# Patient Record
Sex: MALE | Race: WHITE | Employment: FULL TIME | URBAN - METROPOLITAN AREA
[De-identification: names, ages, dates, MRNs, and addresses within clinical notes are randomized per-mention and may not be internally consistent; named-entity substitution may affect disease eponyms.]

---

## 2017-02-21 ENCOUNTER — OFFICE VISIT (OUTPATIENT)
Dept: FAMILY MEDICINE CLINIC | Facility: CLINIC | Age: 42
End: 2017-02-21

## 2017-02-21 VITALS
SYSTOLIC BLOOD PRESSURE: 110 MMHG | WEIGHT: 199.63 LBS | DIASTOLIC BLOOD PRESSURE: 72 MMHG | OXYGEN SATURATION: 98 % | TEMPERATURE: 99 F | HEIGHT: 71.75 IN | BODY MASS INDEX: 27.34 KG/M2 | HEART RATE: 69 BPM | RESPIRATION RATE: 16 BRPM

## 2017-02-21 DIAGNOSIS — G89.29 CHRONIC RIGHT-SIDED THORACIC BACK PAIN: ICD-10-CM

## 2017-02-21 DIAGNOSIS — J01.10 ACUTE NON-RECURRENT FRONTAL SINUSITIS: Primary | ICD-10-CM

## 2017-02-21 DIAGNOSIS — M54.6 CHRONIC RIGHT-SIDED THORACIC BACK PAIN: ICD-10-CM

## 2017-02-21 PROCEDURE — 99213 OFFICE O/P EST LOW 20 MIN: CPT | Performed by: FAMILY MEDICINE

## 2017-02-21 RX ORDER — AMOXICILLIN AND CLAVULANATE POTASSIUM 875; 125 MG/1; MG/1
1 TABLET, FILM COATED ORAL 2 TIMES DAILY
Qty: 20 TABLET | Refills: 0 | Status: SHIPPED | OUTPATIENT
Start: 2017-02-21 | End: 2017-03-02 | Stop reason: ALTCHOICE

## 2017-02-21 NOTE — PROGRESS NOTES
Chief Complaint:  Patient presents with:  Sore Throat: Sore throat, headaches, body aches, post nasal drainage, ears are popping. Referral: Referral to chiropractor. Orders Call: Discuss order for heart scan.     HPI:  This is a 46year old male patient p External Solution Apply topically nightly. Disp:  Rfl:    Amoxicillin-Pot Clavulanate 875-125 MG Oral Tab Take 1 tablet by mouth 2 (two) times daily.  Disp: 20 tablet Rfl: 0   alprazolam (XANAX) 0.5 MG Oral Tab Take 1 tablet (0.5 mg total) by mouth 2 (two) below.  -     Amoxicillin-Pot Clavulanate 875-125 MG Oral Tab; Take 1 tablet by mouth 2 (two) times daily. Chronic right-sided thoracic back pain  Given success with chiropractic care in the past, will refer back to chiropractic care for further care.

## 2017-03-01 ENCOUNTER — TELEPHONE (OUTPATIENT)
Dept: FAMILY MEDICINE CLINIC | Facility: CLINIC | Age: 42
End: 2017-03-01

## 2017-03-01 DIAGNOSIS — J01.41 ACUTE RECURRENT PANSINUSITIS: Primary | ICD-10-CM

## 2017-03-01 NOTE — TELEPHONE ENCOUNTER
In order to write a referral and include clinical information, the patient must see a provider to address his shoulder issues   Please call the patient and assist him with scheduling and appt with Dr Pepper Thomas or any of the mid levels

## 2017-03-01 NOTE — TELEPHONE ENCOUNTER
REFERRAL  Received:  Today       Vanessa Dangelo Emg [address] Clinical Staff Cc: [facility]       Phone Number: [phone]                     Patient Name: JAY JAY TIRADO   : 10/24/1975   Reason for the order/referral:   PCP: Marina Garcia

## 2017-03-01 NOTE — TELEPHONE ENCOUNTER
Pt was seen last Tuesday and given a antibiotic and is not feeling any better he is still on the antibiotic but is concerned because he is not getting better.

## 2017-03-02 ENCOUNTER — OFFICE VISIT (OUTPATIENT)
Dept: FAMILY MEDICINE CLINIC | Facility: CLINIC | Age: 42
End: 2017-03-02

## 2017-03-02 VITALS
SYSTOLIC BLOOD PRESSURE: 110 MMHG | HEART RATE: 72 BPM | WEIGHT: 196.19 LBS | BODY MASS INDEX: 26.87 KG/M2 | DIASTOLIC BLOOD PRESSURE: 70 MMHG | HEIGHT: 71.75 IN | RESPIRATION RATE: 12 BRPM

## 2017-03-02 DIAGNOSIS — S43.431A SUPERIOR GLENOID LABRUM LESION OF RIGHT SHOULDER, INITIAL ENCOUNTER: Primary | ICD-10-CM

## 2017-03-02 PROCEDURE — 99213 OFFICE O/P EST LOW 20 MIN: CPT | Performed by: FAMILY MEDICINE

## 2017-03-02 RX ORDER — PREDNISONE 20 MG/1
40 TABLET ORAL DAILY
Qty: 10 TABLET | Refills: 0 | Status: SHIPPED | OUTPATIENT
Start: 2017-03-02 | End: 2017-03-02 | Stop reason: ALTCHOICE

## 2017-03-02 RX ORDER — LEVOFLOXACIN 500 MG/1
500 TABLET, FILM COATED ORAL DAILY
Qty: 10 TABLET | Refills: 0 | Status: SHIPPED | OUTPATIENT
Start: 2017-03-02 | End: 2017-03-12

## 2017-03-02 NOTE — TELEPHONE ENCOUNTER
Called patient on cell number, informed him of 2 new Rx's, pt requested scripts to [facility] on [address], Dr Mane Alvarez already send these in, called [facility] on [address], Pharmacy closed, but left detailed message on pharmacy phone to transfer both [phone]

## 2017-03-02 NOTE — TELEPHONE ENCOUNTER
Let's change to Levaquin and add prednisone. If this does not resolve, needs to be seen. Should be using flonase as well.   Adrien Cueva, DO

## 2017-03-02 NOTE — PROGRESS NOTES
Patient presents with:  Referral: shoulder pain      HPI:   Christiana Finch is a 46year old male who presents to the office for R shoulder issues. R shoulder - chronic issues. Years ago got referral to see Vidya amaya. This did help.   Completed P

## 2017-03-06 PROBLEM — M50.30 DEGENERATION OF CERVICAL INTERVERTEBRAL DISC: Status: ACTIVE | Noted: 2017-03-06

## 2017-03-06 PROBLEM — M54.12 RIGHT CERVICAL RADICULOPATHY: Status: ACTIVE | Noted: 2017-03-06

## 2017-03-06 PROBLEM — G54.0 THORACIC OUTLET SYNDROME: Status: ACTIVE | Noted: 2017-03-06

## 2017-03-06 PROBLEM — S43.431D LABRAL TEAR OF SHOULDER, RIGHT, SUBSEQUENT ENCOUNTER: Status: ACTIVE | Noted: 2017-03-06

## 2017-03-09 ENCOUNTER — HOSPITAL ENCOUNTER (OUTPATIENT)
Dept: PHYSICAL THERAPY | Facility: HOSPITAL | Age: 42
Setting detail: THERAPIES SERIES
Discharge: HOME | End: 2017-03-09
Attending: ORTHOPAEDIC SURGERY
Payer: COMMERCIAL

## 2017-03-09 DIAGNOSIS — M25.511 RIGHT SHOULDER PAIN, UNSPECIFIED CHRONICITY: Primary | ICD-10-CM

## 2017-03-09 DIAGNOSIS — M54.12 RIGHT CERVICAL RADICULOPATHY: ICD-10-CM

## 2017-03-09 DIAGNOSIS — M50.30 DEGENERATION OF CERVICAL INTERVERTEBRAL DISC: ICD-10-CM

## 2017-03-09 DIAGNOSIS — G54.0 THORACIC OUTLET SYNDROME: ICD-10-CM

## 2017-03-09 DIAGNOSIS — M54.10 RADICULOPATHY OF ARM: ICD-10-CM

## 2017-03-09 PROCEDURE — 97162 PT EVAL MOD COMPLEX 30 MIN: CPT

## 2017-03-09 PROCEDURE — 97140 MANUAL THERAPY 1/> REGIONS: CPT

## 2017-03-09 PROCEDURE — 97110 THERAPEUTIC EXERCISES: CPT

## 2017-03-09 NOTE — PROGRESS NOTES
SPINE EVALUATION:   Referring Physician: Dr. Nenita Hugo  Diagnosis: Right shoulder pain, unspecified chronicity (M25.511)  Radiculopathy of arm (M54.10)  Degeneration of cervical intervertebral disc (M50.30)  Right cervical radiculopathy (M54.12)  Thoracic ADL's, disturbed sleep at night, lifting, carrying. Keagan describes prior level of function very active, has young children. Pt goals include no more nerve pain; ability to use shoulder. Past medical history was reviewed with Keagan.  Significant findings region.      Strength:    strength 50/58/58 kg; 48/48/48 kg    Flexibility:   UE/Scapular   Upper Trap: R moderate; L moderate  Levator Scap: R moderate; L moderate  Pec Major: R moderate; L moderate     Special tests:   ULTT1: L: -; R: + with increased instruction    Education or treatment limitation: high symptoms irritability, distal symptoms  Rehab Potential:good    FOTO: 41/100    Patient/Family/Caregiver was advised of these findings, precautions, and treatment options and has agreed to actively par

## 2017-03-14 ENCOUNTER — HOSPITAL ENCOUNTER (OUTPATIENT)
Dept: MRI IMAGING | Facility: HOSPITAL | Age: 42
Discharge: HOME | End: 2017-03-14
Attending: ORTHOPAEDIC SURGERY
Payer: COMMERCIAL

## 2017-03-14 ENCOUNTER — APPOINTMENT (OUTPATIENT)
Dept: PHYSICAL THERAPY | Facility: HOSPITAL | Age: 42
End: 2017-03-14
Attending: ORTHOPAEDIC SURGERY
Payer: COMMERCIAL

## 2017-03-14 DIAGNOSIS — M50.30 DEGENERATION OF CERVICAL INTERVERTEBRAL DISC: ICD-10-CM

## 2017-03-14 DIAGNOSIS — M54.12 RIGHT CERVICAL RADICULOPATHY: ICD-10-CM

## 2017-03-16 ENCOUNTER — APPOINTMENT (OUTPATIENT)
Dept: PHYSICAL THERAPY | Facility: HOSPITAL | Age: 42
End: 2017-03-16
Attending: ORTHOPAEDIC SURGERY
Payer: COMMERCIAL

## 2017-03-20 ENCOUNTER — HOSPITAL ENCOUNTER (OUTPATIENT)
Dept: MRI IMAGING | Age: 42
Discharge: HOME | End: 2017-03-20
Attending: ORTHOPAEDIC SURGERY
Payer: COMMERCIAL

## 2017-03-20 PROCEDURE — 72141 MRI NECK SPINE W/O DYE: CPT

## 2017-03-20 NOTE — PROGRESS NOTES
Quick Note:    CONCLUSION: Moderate degenerative changes in the cervical spine most notable at C6-C7 where there is a mild to moderate posterior disc osteophyte complex which extends into the right neural foramen resulting in moderate to severe foraminal n

## 2017-03-21 ENCOUNTER — APPOINTMENT (OUTPATIENT)
Dept: PHYSICAL THERAPY | Facility: HOSPITAL | Age: 42
End: 2017-03-21
Attending: ORTHOPAEDIC SURGERY
Payer: COMMERCIAL

## 2017-03-22 ENCOUNTER — TELEPHONE (OUTPATIENT)
Dept: FAMILY MEDICINE CLINIC | Facility: CLINIC | Age: 42
End: 2017-03-22

## 2017-03-22 DIAGNOSIS — M54.12 RIGHT CERVICAL RADICULOPATHY: ICD-10-CM

## 2017-03-22 DIAGNOSIS — M50.30 DDD (DEGENERATIVE DISC DISEASE), CERVICAL: Primary | ICD-10-CM

## 2017-03-22 DIAGNOSIS — M54.12 CERVICAL RADICULOPATHY AT C7: Primary | ICD-10-CM

## 2017-03-22 NOTE — TELEPHONE ENCOUNTER
Please see the Referral Encounter from today, Dr Jonelle Vela has suggested patient see pain management

## 2017-03-22 NOTE — TELEPHONE ENCOUNTER
REFERRAL  Received: Yesterday       Baron Fong Em[address] Clinical Staff Cc: [facility]       Phone Number: [phone]                     Patient Name: Izabella Barney   : 10/24/1975   Reason for the order/referral:   PCP:  SHARMAINE

## 2017-03-22 NOTE — TELEPHONE ENCOUNTER
See separate documentation for DNI referral for neurosurgery who can either arrange for Dr. Eduardo Donovan or can consider other options

## 2017-03-22 NOTE — TELEPHONE ENCOUNTER
Pt called from work in excrutiating pain. Pt has shoulder pain and neck pain already documented. Pt states pain is so bad he has headache and his mouth/jaw hurts.   Pt saw Dr Aayush Ng as instructed, and Dr Aayush Ng wants Pt to see a pain specialist for possib

## 2017-03-22 NOTE — TELEPHONE ENCOUNTER
Patient seen Dr Juan Ayala, completed MRI cervical; see below  Referral to Dr Alan Tobin PENDING above    Notes Recorded by Delta Chau MD on 3/20/2017 at 4:38 PM  CONCLUSION: Moderate degenerative changes in the cervical spine most notable at C6-C7 where ther

## 2017-03-22 NOTE — TELEPHONE ENCOUNTER
Actually shoulder specialist already saw him (Dr. Aureliano Lane), ordered MRI and it looks like he may have a C7 radiculopathy.   Please get him in with the [facility] neurovascular Early neurosurgeon such as Dr. Joan Levy    Diagnoses and all orders for this visit:

## 2017-03-23 ENCOUNTER — APPOINTMENT (OUTPATIENT)
Dept: PHYSICAL THERAPY | Facility: HOSPITAL | Age: 42
End: 2017-03-23
Attending: ORTHOPAEDIC SURGERY
Payer: COMMERCIAL

## 2017-03-23 ENCOUNTER — TELEPHONE (OUTPATIENT)
Dept: FAMILY MEDICINE CLINIC | Facility: CLINIC | Age: 42
End: 2017-03-23

## 2017-03-23 DIAGNOSIS — M54.12 RIGHT CERVICAL RADICULOPATHY: ICD-10-CM

## 2017-03-23 DIAGNOSIS — M50.30 DEGENERATION OF CERVICAL INTERVERTEBRAL DISC: Primary | ICD-10-CM

## 2017-03-23 NOTE — TELEPHONE ENCOUNTER
Patient is calling in for a referral for pain clinic, for an injection. Please speak with him he states he has been waiting for our call since Monday.

## 2017-03-23 NOTE — TELEPHONE ENCOUNTER
Patient has called Dr Cayla Cardoso office and wasn't able to get in until 4/6/17 with PA, Dr Heidy Gamez unavailable, Pt is requesting we call to see if he can be seen sooner  Called and spoke to [facility], she is aware of the patient's need to be seen sooner and there is

## 2017-03-23 NOTE — TELEPHONE ENCOUNTER
Patient called office, informed that Dr Morelia Vu preferred he see neurosurgeon first and then surgeon can decide if moving forward with Dr Benjamin Ji the pain specialist is appropriate.   Patient did not agree with this course and is refusing to see the neurosurgeon,

## 2017-03-23 NOTE — TELEPHONE ENCOUNTER
Pt is requesting  RX to help manage the pain until his appointment at [facility] on 4/6/17, LOV with Dr Sacha Vera 03/02/17, Pt aware Dr Sacha Vera is out of the office today.   in the past he has received   Prednisone 20 mgs, 2 tabs daily from Dr Vadim Mora; did help

## 2017-03-24 RX ORDER — CYCLOBENZAPRINE HCL 10 MG
10 TABLET ORAL 3 TIMES DAILY
Qty: 60 TABLET | Refills: 1 | Status: SHIPPED | OUTPATIENT
Start: 2017-03-24 | End: 2017-04-13

## 2017-03-24 NOTE — TELEPHONE ENCOUNTER
Steroids without an end point not a great idea for this  happyt to Rx the flexeril. Use TID PRN, but careful as SE is sedation. Do not take and drive. For the stronger meds, would have to contact víctor.

## 2017-03-24 NOTE — TELEPHONE ENCOUNTER
Called Pt and left message on mobile phone with response from Dr Justina Keys. Advised in message that Pt can call office if she has questions.

## 2017-03-27 ENCOUNTER — APPOINTMENT (OUTPATIENT)
Dept: PHYSICAL THERAPY | Facility: HOSPITAL | Age: 42
End: 2017-03-27
Attending: ORTHOPAEDIC SURGERY
Payer: COMMERCIAL

## 2017-03-30 ENCOUNTER — APPOINTMENT (OUTPATIENT)
Dept: PHYSICAL THERAPY | Facility: HOSPITAL | Age: 42
End: 2017-03-30
Attending: ORTHOPAEDIC SURGERY
Payer: COMMERCIAL

## 2017-04-03 ENCOUNTER — APPOINTMENT (OUTPATIENT)
Dept: PHYSICAL THERAPY | Facility: HOSPITAL | Age: 42
End: 2017-04-03
Attending: ORTHOPAEDIC SURGERY
Payer: COMMERCIAL

## 2017-04-06 ENCOUNTER — OFFICE VISIT (OUTPATIENT)
Dept: SURGERY | Facility: CLINIC | Age: 42
End: 2017-04-06

## 2017-04-06 ENCOUNTER — TELEPHONE (OUTPATIENT)
Dept: SURGERY | Facility: CLINIC | Age: 42
End: 2017-04-06

## 2017-04-06 VITALS — DIASTOLIC BLOOD PRESSURE: 80 MMHG | RESPIRATION RATE: 14 BRPM | HEART RATE: 100 BPM | SYSTOLIC BLOOD PRESSURE: 118 MMHG

## 2017-04-06 DIAGNOSIS — M47.812 CERVICAL SPONDYLOSIS WITHOUT MYELOPATHY: ICD-10-CM

## 2017-04-06 DIAGNOSIS — M54.12 CERVICAL RADICULOPATHY AT C7: Primary | ICD-10-CM

## 2017-04-06 DIAGNOSIS — M50.30 DDD (DEGENERATIVE DISC DISEASE), CERVICAL: ICD-10-CM

## 2017-04-06 DIAGNOSIS — M54.12 CERVICAL RADICULITIS: Primary | ICD-10-CM

## 2017-04-06 PROCEDURE — 99205 OFFICE O/P NEW HI 60 MIN: CPT | Performed by: NURSE PRACTITIONER

## 2017-04-06 RX ORDER — GABAPENTIN 100 MG/1
300 CAPSULE ORAL NIGHTLY
Qty: 90 CAPSULE | Refills: 0 | Status: SHIPPED | OUTPATIENT
Start: 2017-04-06 | End: 2017-11-27 | Stop reason: ALTCHOICE

## 2017-04-06 NOTE — H&P
Name: Christiana Finch   : 10/24/1975   DOS: 2017     Patient presents with:  Neck Pain: NP referred by Dr. Nehal Davidson          History of Present Illness:     Christiana Finch is a 46year old male referred by PCP.  Complaining of right cervical pain which Acute tonsillitis    • Concussion    • Dizziness    • Foot pain      soft tissue   • Lower back pain    • Otitis media of left ear    • Skin rash    • Sore throat    • URI (upper respiratory infection)    • Viral labyrinthitis    • Lipid screening 5/12/201 concentration & attention span intact. Inspection:  Ambulates with well-coordinated, fluid, non-antalgic gait. Heel and toe walking intact. Romberg negative. Tandem walk intact  HEENT: No gross lesion noted. PEERL. No icterus, ptosis, or nystagmus.   Car radiculopathy      Assessment and Plan     Acute pain that has failed conservative treatment consisting of medications, activity modification, PT, chiropractor. Review of radiology reports and films. MRI shows C7 radiculopathy.   He has weakness to the ri

## 2017-04-06 NOTE — PROGRESS NOTES
HPI:    Patient ID: Prieto Gross is a 46year old male. HPI    Review of Systems         Current Outpatient Prescriptions:  Cyclobenzaprine HCl 10 MG Oral Tab Take 1 tablet (10 mg total) by mouth 3 (three) times daily.  Disp: 60 tablet Rfl: 1   Acetam Past Treatments for Current Pain Condition:   Physical Therapy and Other accupunctur, chiropractor,dry needle,     Prior diagnostic testing for your pain:  Mri, xray

## 2017-04-06 NOTE — PATIENT INSTRUCTIONS
Refill policies:    • Allow 2 business days for refills; controlled substances may take longer.   • Contact your pharmacy at least 5 days prior to running out of medication and have them send an electronic request or submit request through the “request re insurance carrier to obtain pre-certification or prior authorization. Unfortunately, [employer] has seen an increase in denial of payment even though the procedure/test has been pre-certified.   You are strongly encouraged to contact your insurance carrier to v please contact the office 48 hours prior to your procedure.       Medication:   Number of days you need to be off for the following medications:  • Aggrenox 10 days   • Agrylin (Anagrelide) 10 days   • Enbrel (Etanercept) 24 hours   • Fragmin (Dalteparin) 2 temporary increase in your blood sugar. Contact your primary care physician if your blood sugar rises as you may require some medication adjustment.   It is normal to have increased pain at injection site for up to 3-5 days after procedure, you can use hea a local anesthetic. Some patients choose to receive intravenous sedation that can make the procedure easier to tolerate. This type of sedation may cause amnesia and patients may not remember parts or all of the actual procedure.    How is the epidural injec more. If three injections have not helped you very much, you may be a candidate for a different procedure. It is important to keep follow up appointment to clearly communicate with your provider how you are feeling. Will the epidural injection help me?   I

## 2017-04-10 ENCOUNTER — APPOINTMENT (OUTPATIENT)
Dept: PHYSICAL THERAPY | Facility: HOSPITAL | Age: 42
End: 2017-04-10
Attending: ORTHOPAEDIC SURGERY
Payer: COMMERCIAL

## 2017-04-13 ENCOUNTER — TELEPHONE (OUTPATIENT)
Dept: SURGERY | Facility: CLINIC | Age: 42
End: 2017-04-13

## 2017-04-13 ENCOUNTER — APPOINTMENT (OUTPATIENT)
Dept: PHYSICAL THERAPY | Facility: HOSPITAL | Age: 42
End: 2017-04-13
Attending: ORTHOPAEDIC SURGERY
Payer: COMMERCIAL

## 2017-04-13 NOTE — TELEPHONE ENCOUNTER
P referral entered for Cervical Epidural Steroid Injections  Requesting 3 visits, coverage of [id number]  [facility] referral #[id number]  Referral authorized.

## 2017-05-22 ENCOUNTER — TELEPHONE (OUTPATIENT)
Dept: SURGERY | Facility: CLINIC | Age: 42
End: 2017-05-22

## 2017-05-22 ENCOUNTER — OFFICE VISIT (OUTPATIENT)
Dept: FAMILY MEDICINE CLINIC | Facility: CLINIC | Age: 42
End: 2017-05-22

## 2017-05-22 VITALS
SYSTOLIC BLOOD PRESSURE: 108 MMHG | HEIGHT: 71 IN | WEIGHT: 198.63 LBS | RESPIRATION RATE: 16 BRPM | HEART RATE: 88 BPM | DIASTOLIC BLOOD PRESSURE: 64 MMHG | BODY MASS INDEX: 27.81 KG/M2 | TEMPERATURE: 99 F

## 2017-05-22 DIAGNOSIS — J30.1 SEASONAL ALLERGIC RHINITIS DUE TO POLLEN: Primary | ICD-10-CM

## 2017-05-22 PROCEDURE — 99213 OFFICE O/P EST LOW 20 MIN: CPT | Performed by: PHYSICIAN ASSISTANT

## 2017-05-22 NOTE — PROGRESS NOTES
CC:  Patient presents with: Allergies: Moved to a new house 8/2016. Beginning 6 days ago has been feverish,ears are popping, throat is sore (less sore today),PND,cough,chilling at times. Taking claritin with some relief.  Is expecorating yellow mucus and y Take  by mouth. Disp:  Rfl:      No current facility-administered medications on file prior to visit.     Review of Systems:     Constitutional: No fatigue; normal energy; no weight changes; no fever   HENT: See HPI  Eyes: Normal vision; no eye pain or FBs Active Problem List:     Acute reaction to stress     Contact dermatitis and other eczema, due to unspecified cause     Closed head injury with concussion     Tenosynovitis of foot and ankle     Sprain and strain of unspecified site of shoulder and upper a

## 2017-06-01 ENCOUNTER — TELEPHONE (OUTPATIENT)
Dept: SURGERY | Facility: CLINIC | Age: 42
End: 2017-06-01

## 2017-06-01 ENCOUNTER — HOSPITAL ENCOUNTER (OUTPATIENT)
Dept: GENERAL RADIOLOGY | Facility: HOSPITAL | Age: 42
Setting detail: HOSPITAL OUTPATIENT SURGERY
Discharge: HOME | End: 2017-06-01
Attending: ANESTHESIOLOGY
Payer: COMMERCIAL

## 2017-06-01 ENCOUNTER — SURGERY (OUTPATIENT)
Age: 42
End: 2017-06-01

## 2017-06-01 ENCOUNTER — HOSPITAL ENCOUNTER (OUTPATIENT)
Facility: HOSPITAL | Age: 42
Setting detail: HOSPITAL OUTPATIENT SURGERY
Discharge: HOME | End: 2017-06-01
Attending: ANESTHESIOLOGY | Admitting: ANESTHESIOLOGY
Payer: COMMERCIAL

## 2017-06-01 VITALS
DIASTOLIC BLOOD PRESSURE: 87 MMHG | TEMPERATURE: 99 F | HEART RATE: 69 BPM | RESPIRATION RATE: 16 BRPM | OXYGEN SATURATION: 97 % | SYSTOLIC BLOOD PRESSURE: 134 MMHG

## 2017-06-01 DIAGNOSIS — M54.12 CERVICAL RADICULITIS: ICD-10-CM

## 2017-06-01 DIAGNOSIS — G89.29 CHRONIC NECK PAIN: ICD-10-CM

## 2017-06-01 DIAGNOSIS — M54.2 CHRONIC NECK PAIN: ICD-10-CM

## 2017-06-01 RX ORDER — SODIUM CHLORIDE, SODIUM LACTATE, POTASSIUM CHLORIDE, CALCIUM CHLORIDE 600; 310; 30; 20 MG/100ML; MG/100ML; MG/100ML; MG/100ML
100 INJECTION, SOLUTION INTRAVENOUS CONTINUOUS
Status: DISCONTINUED | OUTPATIENT
Start: 2017-06-01 | End: 2017-06-01

## 2017-06-01 NOTE — H&P
History & Physical Examination    Patient Name: Reilly Burns  MRN: [id number]  CSN: [id number]  YOB: 1975    Pre-Operative Diagnosis:  Cervical radiculitis [M54.12]    Present Illness: A 46year old male with neck pain is here for cervical History    OTHER SURGICAL HISTORY  1996    Comment urethra surgery, removal of cyst    VASECTOMY Bilateral 10/15/2015    Comment Dr. Vane Vasques     Family History   Problem Relation Age of Onset   • CHF[other] [OTHER] Mother    • Heart Disease Father    • Other proceed with plan of care. [ x ] I have reviewed the History and Physical done within the last 30 days. Any changes noted above.     ANMOL [place]

## 2017-06-01 NOTE — PROGRESS NOTES
Dr. Florencia Tay with pt in preop, states pt is pain free now. Case cancelled. Pt sent home in good condition.

## 2017-06-01 NOTE — TELEPHONE ENCOUNTER
Patient's procedure was cancelled by Dr. Donya Valverde today because patient was not having pain at this time.  He has asked patient to call our office back if the pain should flare again so that we can quickly get him scheduled for an injection procedure at that ti

## 2017-06-07 ENCOUNTER — TELEPHONE (OUTPATIENT)
Dept: SURGERY | Facility: CLINIC | Age: 42
End: 2017-06-07

## 2017-06-07 RX ORDER — METHOCARBAMOL 500 MG/1
500 TABLET, FILM COATED ORAL EVERY 8 HOURS PRN
Qty: 90 TABLET | Refills: 0 | Status: SHIPPED | OUTPATIENT
Start: 2017-06-07 | End: 2017-11-27 | Stop reason: ALTCHOICE

## 2017-06-07 NOTE — TELEPHONE ENCOUNTER
Case removed from OR schedule. Confirmed cancellation with pt. Informed pt medication would need to be ordered thru Neurosurgery office. Pt verbalized understanding. New TE for med request routed to neurosurgery.

## 2017-06-07 NOTE — TELEPHONE ENCOUNTER
Pt called office to cancel procedures and requesting muscle relaxant. Pt was NP seen by ANMOL Ortiz on 4/6/17.       Per OV notes:    Assessment and Plan      Acute pain that has failed conservative treatment consisting of medications, activity modification,

## 2017-06-07 NOTE — TELEPHONE ENCOUNTER
Methocarbamol 500mg q8h PRN prescribed. Please advise patient to take 1st dose at night to see if there are any sedating effects.

## 2017-09-19 ENCOUNTER — TELEPHONE (OUTPATIENT)
Dept: FAMILY MEDICINE CLINIC | Facility: CLINIC | Age: 42
End: 2017-09-19

## 2017-09-19 DIAGNOSIS — G54.0 THORACIC OUTLET SYNDROME: ICD-10-CM

## 2017-09-19 DIAGNOSIS — S43.431D LABRAL TEAR OF SHOULDER, RIGHT, SUBSEQUENT ENCOUNTER: ICD-10-CM

## 2017-09-19 DIAGNOSIS — M54.12 RIGHT CERVICAL RADICULOPATHY: ICD-10-CM

## 2017-09-19 DIAGNOSIS — M50.30 DEGENERATION OF CERVICAL INTERVERTEBRAL DISC: ICD-10-CM

## 2017-09-19 DIAGNOSIS — M25.511 PAIN IN JOINT OF RIGHT SHOULDER: Primary | ICD-10-CM

## 2017-09-19 NOTE — TELEPHONE ENCOUNTER
Reviewed MEDIA, Patient has been seen by [facility] Dr Jean Paul Mays for years for LBP, will request LOV note to get most update information before completing referral request

## 2017-09-19 NOTE — TELEPHONE ENCOUNTER
Lincoln Fairbanks CNA  P Em[address] Clinical Staff Cc: [facility]   Phone Number: [phone]             .Reason for the order/referral:Referral   PCP: Dr. Juan Stacy   Refer to Provider: Dr. Geovanna Mendez   Specialty:Chiropractor   Patient Ins

## 2017-09-19 NOTE — TELEPHONE ENCOUNTER
Front Staff  Please call Dr Virgil Blunt and request the LOV with this patient, I am unsure of when this was, please fax is to our office  Thank you.     Keith Niño DC  Chiropractor  [address] [phone]    *Send this TE back to the [facility]

## 2017-09-21 NOTE — TELEPHONE ENCOUNTER
Called Dr. Paolo Owens' office and left message asking that they contact our office, let them know we were looking for last office visit for patient

## 2017-09-25 NOTE — TELEPHONE ENCOUNTER
Called Dr. Shayna Saldivar office, spoke to , she said he is currently out of town until tomorrow. However she did send him a message in regards to our office requesting last office visit.  Gave her our fax and telephone number, she will fax over inf

## 2017-09-28 NOTE — TELEPHONE ENCOUNTER
Patient contacted office wanting to know why we have yet to process his referral. I explained to patient that we are waiting on Dr. Orion Knott office to fax over [address] summary since our clinical supervisor is requesting notes.  Patient will contact Dr. Selvin Erickson

## 2017-10-02 NOTE — TELEPHONE ENCOUNTER
LM for Dr. Anh Teresa to fax over last office visit notes from the last visit prior to referral being entered.

## 2017-10-10 NOTE — TELEPHONE ENCOUNTER
LOV relating to right shoulder and cervical region issued with Dr Cecile Rosales on 03/02/2017, Referral request forward to Dr Cecile Rosales

## 2017-10-10 NOTE — TELEPHONE ENCOUNTER
Unclear if patient is to continue chiropractic therapy of see Neurosurgery regarding right shoulder pain, Cervical pain, and Lumbar pain, Patient CANCELED procedure with Dr Jose Raul Fox and CANCELED Physical Therapy.  LOV with Dr Jono Nguyen March 2017, LOV at [facility] 3 with

## 2017-10-10 NOTE — TELEPHONE ENCOUNTER
Complaining of right cervical pain which radiates in to shoulder girdle scapula and ulnar forearm into the #3, 4 and 5 fingers. At times radiate around from scapula into pectoral muscle. Denies pain to the left. subjective weakness. He is right handed.  [employer]

## 2017-10-30 NOTE — PROGRESS NOTES
Chief Complaint:  Patient presents with: Anxiety: Pt feels that he has stress and anxiety. Pt is saying things that he shouldn't. Feels that he is irritable for past several  months.     HPI:  This is a 38year old male patient presenting for Anxiety (Pt f URI (upper respiratory infection)    • Viral labyrinthitis      Past Surgical History:   Procedure Laterality Date   • Other surgical history  1996    urethra surgery, removal of cyst   • Vasectomy Bilateral 10/15/2015    Dr. Neymar Tran Disp:  Rfl:      Allergies:    Bees                        EXAM:   10/30/17  1406   BP: 120/66   Pulse: 72   Resp: 16   Weight: 206 lb 3.2 oz   Height: 71.25\"     GENERAL: vitals reviewed and listed above, alert, oriented, appears well hydrated and in no

## 2017-11-21 ENCOUNTER — TELEPHONE (OUTPATIENT)
Dept: FAMILY MEDICINE CLINIC | Facility: CLINIC | Age: 42
End: 2017-11-21

## 2017-11-21 DIAGNOSIS — F32.1 MODERATE SINGLE CURRENT EPISODE OF MAJOR DEPRESSIVE DISORDER (HCC): ICD-10-CM

## 2017-11-21 DIAGNOSIS — F41.1 GAD (GENERALIZED ANXIETY DISORDER): ICD-10-CM

## 2017-11-21 NOTE — TELEPHONE ENCOUNTER
Pt is taking sertraline and he has increased his dose from 25mg to 50mg by taking 2. He has a refill available for the 25mg but he would like to get that for the 50mg so he doesn't have to take 2 pills.

## 2017-11-27 ENCOUNTER — OFFICE VISIT (OUTPATIENT)
Dept: FAMILY MEDICINE CLINIC | Facility: CLINIC | Age: 42
End: 2017-11-27

## 2017-11-27 VITALS
BODY MASS INDEX: 27.93 KG/M2 | DIASTOLIC BLOOD PRESSURE: 58 MMHG | RESPIRATION RATE: 16 BRPM | TEMPERATURE: 98 F | HEIGHT: 71.5 IN | SYSTOLIC BLOOD PRESSURE: 102 MMHG | HEART RATE: 80 BPM | WEIGHT: 204 LBS

## 2017-11-27 DIAGNOSIS — J20.9 ACUTE BRONCHITIS, UNSPECIFIED ORGANISM: ICD-10-CM

## 2017-11-27 DIAGNOSIS — J01.90 ACUTE NON-RECURRENT SINUSITIS, UNSPECIFIED LOCATION: Primary | ICD-10-CM

## 2017-11-27 PROCEDURE — 99213 OFFICE O/P EST LOW 20 MIN: CPT | Performed by: FAMILY MEDICINE

## 2017-11-27 RX ORDER — DOXYCYCLINE HYCLATE 100 MG
100 TABLET ORAL 2 TIMES DAILY
Qty: 14 TABLET | Refills: 0 | Status: SHIPPED | OUTPATIENT
Start: 2017-11-27 | End: 2018-01-04 | Stop reason: ALTCHOICE

## 2017-11-27 NOTE — PROGRESS NOTES
Chief Complaint:  Patient presents with:  Nasal Congestion: x 10 days  Ear Problem: clogged feeling x 10 day    HPI:  This is a 38year old male patient presenting for Nasal Congestion (x 10 days) and Ear Problem (clogged feeling x 10 day)    Children had (50 mg total) by mouth daily. Disp: 30 tablet Rfl: 1   Aluminum Chloride 20 % External Solution Apply 1 Application topically nightly. Disp: 1 Bottle Rfl: 0   GLUCOSAMINE SULFATE OR Take 2 capsules by mouth daily.  Disp:  Rfl:    alprazolam (XANAX) 0.5 MG O bronchitis, unspecified organism  Given sinus tenderness and duration of symptoms, will treat for sinusitis with doxycycline (cross-coverage of lungs including atypicals). -     Doxycycline Hyclate 100 MG Oral Tab;  Take 1 tablet (100 mg total) by mouth 2

## 2017-11-30 NOTE — PROGRESS NOTES
Chief Complaint:  Patient presents with:  Medication Follow-Up    HPI:  This is a 38year old male patient presenting for Medication Follow-Up    Started zoloft and notes that this has improved his symptoms.  Notes he is not as irritable as he was previousl Heart Disease Father    • Other[other] [OTHER] Father      Social history:  Smoking status: Former Smoker                                                              Packs/day: 0.50      Years: 15.00        Types: Cigarettes     Quit date: 1/1/2001  Smoke Visit     None      Visit Diagnoses     CONSTANTINO (generalized anxiety disorder)    -  Primary    Relevant Medications    Sertraline HCl 50 MG Oral Tab    Moderate single current episode of major depressive disorder (HCC)        Relevant Medications    Sertralin

## 2017-12-28 ENCOUNTER — OFFICE VISIT (OUTPATIENT)
Dept: FAMILY MEDICINE CLINIC | Facility: CLINIC | Age: 42
End: 2017-12-28

## 2017-12-28 VITALS
TEMPERATURE: 98 F | SYSTOLIC BLOOD PRESSURE: 110 MMHG | WEIGHT: 204.38 LBS | RESPIRATION RATE: 16 BRPM | BODY MASS INDEX: 27.99 KG/M2 | HEART RATE: 76 BPM | HEIGHT: 71.75 IN | DIASTOLIC BLOOD PRESSURE: 72 MMHG

## 2017-12-28 DIAGNOSIS — J01.10 ACUTE NON-RECURRENT FRONTAL SINUSITIS: Primary | ICD-10-CM

## 2017-12-28 DIAGNOSIS — J34.89 SINUS PRESSURE: ICD-10-CM

## 2017-12-28 DIAGNOSIS — J02.9 SORE THROAT: ICD-10-CM

## 2017-12-28 PROCEDURE — 87880 STREP A ASSAY W/OPTIC: CPT | Performed by: PHYSICIAN ASSISTANT

## 2017-12-28 PROCEDURE — 99213 OFFICE O/P EST LOW 20 MIN: CPT | Performed by: PHYSICIAN ASSISTANT

## 2017-12-28 RX ORDER — AZITHROMYCIN 250 MG/1
TABLET, FILM COATED ORAL
Qty: 6 TABLET | Refills: 0 | Status: SHIPPED | OUTPATIENT
Start: 2017-12-28 | End: 2018-01-04 | Stop reason: ALTCHOICE

## 2017-12-28 NOTE — PROGRESS NOTES
CC:  Patient presents with:  Sore Throat  Fever      HPI: Nicholas Nguyen presents with complaints of a ST, nasal congestion, sinus pressure, and a sinus HA in the frontal area. He had a fever last night. He has taken ibuprofen and Mucinex D with some relief.  No co Constitutional: No fatigue; normal energy; no weight changes  HENT: See HPI  Eyes: Normal vision; no eye pain or FBs   Respiratory: See HPI  Cardiovascular: No CP or palpitations; no peripheral edema  Gastrointestinal: Normal bowels; no abdominal pain side effects of the medication I prescribed. I asked him to use nasal saline rinses and/or a Neti Pot with distilled water for nasal congestion. I asked him to use Sudafed or another OTC decongestant to help with nasal congestion.   I asked him to use OT

## 2018-01-04 ENCOUNTER — OFFICE VISIT (OUTPATIENT)
Dept: FAMILY MEDICINE CLINIC | Facility: CLINIC | Age: 43
End: 2018-01-04

## 2018-01-04 VITALS
DIASTOLIC BLOOD PRESSURE: 64 MMHG | HEART RATE: 84 BPM | SYSTOLIC BLOOD PRESSURE: 110 MMHG | TEMPERATURE: 98 F | HEIGHT: 71.6 IN | BODY MASS INDEX: 28.07 KG/M2 | WEIGHT: 205 LBS

## 2018-01-04 DIAGNOSIS — J01.00 ACUTE NON-RECURRENT MAXILLARY SINUSITIS: Primary | ICD-10-CM

## 2018-01-04 PROCEDURE — 99213 OFFICE O/P EST LOW 20 MIN: CPT | Performed by: FAMILY MEDICINE

## 2018-01-04 RX ORDER — AMOXICILLIN AND CLAVULANATE POTASSIUM 875; 125 MG/1; MG/1
1 TABLET, FILM COATED ORAL 2 TIMES DAILY
Qty: 28 TABLET | Refills: 0 | Status: SHIPPED | OUTPATIENT
Start: 2018-01-04 | End: 2018-01-14

## 2018-01-04 NOTE — PROGRESS NOTES
Chief Complaint:  Patient presents with:  Sinus Problem:  thick blood tinged mucus- finaihed Z-Jose today   Nose Problem: nasal congestion with blood tinged mucus- dark   Ear Pain: right ear pain-can hear fluid      HPI:  This is a 38year old male patient Packs/day: 0.50      Years: 15.00        Types: Cigarettes     Quit date: 1/1/2001  Smokeless tobacco: Never Used                      Alcohol use: Yes           1.2 oz/week     Standard drinks or equivalent: 2 per week assessment   Problem List Items Addressed This Visit     None      Visit Diagnoses     Acute non-recurrent maxillary sinusitis    -  Primary    Relevant Medications    Amoxicillin-Pot Clavulanate 875-125 MG Oral Tab          Advised the following:  Keagan nettles

## 2018-06-15 ENCOUNTER — OFFICE VISIT (OUTPATIENT)
Dept: FAMILY MEDICINE CLINIC | Facility: CLINIC | Age: 43
End: 2018-06-15

## 2018-06-15 VITALS
HEIGHT: 72 IN | TEMPERATURE: 98 F | BODY MASS INDEX: 27.77 KG/M2 | OXYGEN SATURATION: 98 % | HEART RATE: 73 BPM | DIASTOLIC BLOOD PRESSURE: 82 MMHG | RESPIRATION RATE: 20 BRPM | SYSTOLIC BLOOD PRESSURE: 132 MMHG | WEIGHT: 205 LBS

## 2018-06-15 DIAGNOSIS — J02.9 ACUTE PHARYNGITIS, UNSPECIFIED ETIOLOGY: ICD-10-CM

## 2018-06-15 DIAGNOSIS — J02.9 SORE THROAT: Primary | ICD-10-CM

## 2018-06-15 PROCEDURE — 87880 STREP A ASSAY W/OPTIC: CPT | Performed by: NURSE PRACTITIONER

## 2018-06-15 PROCEDURE — 87081 CULTURE SCREEN ONLY: CPT | Performed by: NURSE PRACTITIONER

## 2018-06-15 PROCEDURE — 99213 OFFICE O/P EST LOW 20 MIN: CPT | Performed by: NURSE PRACTITIONER

## 2018-06-16 NOTE — PATIENT INSTRUCTIONS
Pharyngitis (Sore Throat), Report Pending    Pharyngitis (sore throat) is often due to a virus. It can also be caused by the streptococcus, or strep, bacterium, often called strep throat.  Both viral and strep infections can cause throat pain that is wors · For children: Use acetaminophen for fever, fussiness, or discomfort.  In infants older than 10months of age, you may use ibuprofen instead of acetaminophen. Talk with your child's healthcare provider before giving these medicines if your child has chronic · Signs of dehydration (very dark urine or no urine, sunken eyes, dizziness)  · Trouble breathing or noisy breathing  · Muffled voice  · New rash  · Child appears to be getting sicker  Date Last Reviewed: 4/13/2015  © 0011-1839 [employer].  8

## 2018-06-16 NOTE — PROGRESS NOTES
CHIEF COMPLAINT:   Patient presents with:  Sore Throat: fatigue, headache, body ache x2days        HPI:   Neftaly Taylor is a 38year old male presents to clinic with complaint of sore throat. Patient has had 2 days.  Symptoms have been persisting and wo Alcohol use: Yes           1.2 oz/week     Standard drinks or equivalent: 2 per week       REVIEW OF SYSTEMS:   GENERAL HEALTH: no change in appetite  SKIN: denies any unusual skin lesions or rashes  HEENT: denies ear pain, See HPI  RESPIRATORY: denies joe Meds & Refills for this Visit:  No prescriptions requested or ordered in this encounter    Imaging & Consults:  None      Plan: Discussed that due to symptoms and negative rapid strep this is most likely viral and does not require antibiotics.  Will send th · If the test is positive for strep, don't go to work or school for the first 2 days of taking the antibiotics. After this time, you will not be contagious.  You can then return to work or school if you are feeling better.   · Take the antibiotic medicine f ¨ Your child is of any age and has repeated fevers above 104°F (40°C). ¨ Your child is younger than 3years of age and has a fever of 100.4°F (38°C) that continues for more than 1 day.   ¨ Your child is 3years old or older and has a fever of 100.4°F (38°C

## 2018-06-17 ENCOUNTER — TELEPHONE (OUTPATIENT)
Dept: FAMILY MEDICINE CLINIC | Facility: CLINIC | Age: 43
End: 2018-06-17

## 2018-06-17 DIAGNOSIS — J02.0 PHARYNGITIS DUE TO STREPTOCOCCUS SPECIES: Primary | ICD-10-CM

## 2018-06-17 RX ORDER — AMOXICILLIN 875 MG/1
875 TABLET, COATED ORAL 2 TIMES DAILY
Qty: 20 TABLET | Refills: 0 | Status: SHIPPED | OUTPATIENT
Start: 2018-06-17 | End: 2018-06-27

## 2018-06-17 NOTE — TELEPHONE ENCOUNTER
Spoke with patient. Advised him that his Throat Culture report just showed a growth of a strain of Strep which does not usually cause sore throat symptoms. Pt is still not feeling well and still has sore throat pain. Pt is no worse.   Advised that I woul

## 2018-08-07 DIAGNOSIS — F32.1 MODERATE SINGLE CURRENT EPISODE OF MAJOR DEPRESSIVE DISORDER (HCC): ICD-10-CM

## 2018-08-07 DIAGNOSIS — F41.1 GAD (GENERALIZED ANXIETY DISORDER): ICD-10-CM

## 2018-08-09 NOTE — TELEPHONE ENCOUNTER
Pt made appt with Dr. Ashwini Fontanez 8/13/18 for refill however he is completely out of his medication.

## 2018-08-09 NOTE — TELEPHONE ENCOUNTER
LOV 1/4/18 was acute. CONSTANTINO was last addressed 11/30/17 aqnd at that time, pt was advised to follow up in 6 months. No future appointments.      Refill request for:      Pending Prescriptions Disp Refills    SERTRALINE HCL 50 MG Oral Tab [Pharmacy Med Name

## 2018-08-13 NOTE — PROGRESS NOTES
Chief Complaint:  Patient presents with:  Depression: f/u and refills    HPI:  This is a 38year old male patient presenting for Depression (f/u and refills)    CONSTANTINO, MDD:  Has been well maintained on zoloft.  Notes that he would like to give a trial off the Bilateral      Comment: Dr. Alden Waldrop   Family History   Problem Relation Age of Onset   • CHF[other] [OTHER] Mother    • Heart Disease Father    • Other[other] [OTHER] Father       Smoking status: Former Smoker Items Addressed This Visit     None      Visit Diagnoses     CONSTANTINO (generalized anxiety disorder)    -  Primary    Moderate single current episode of major depressive disorder (HCC)        Routine health maintenance        Relevant Orders    COMP METABOLIC P

## 2018-11-02 ENCOUNTER — HOSPITAL ENCOUNTER (OUTPATIENT)
Dept: CT IMAGING | Age: 43
Discharge: HOME | End: 2018-11-02
Attending: FAMILY MEDICINE

## 2018-11-02 DIAGNOSIS — Z13.9 ENCOUNTER FOR SCREENING: ICD-10-CM

## 2018-11-02 PROBLEM — IMO0001 PULMONARY NODULE LESS THAN 6 CM DETERMINED BY COMPUTED TOMOGRAPHY OF LUNG: Status: ACTIVE | Noted: 2018-11-02

## 2018-11-02 PROBLEM — R91.1 PULMONARY NODULE LESS THAN 6 CM DETERMINED BY COMPUTED TOMOGRAPHY OF LUNG: Status: ACTIVE | Noted: 2018-11-02

## 2019-03-01 ENCOUNTER — OFFICE VISIT (OUTPATIENT)
Dept: FAMILY MEDICINE CLINIC | Facility: CLINIC | Age: 44
End: 2019-03-01

## 2019-03-01 VITALS
RESPIRATION RATE: 16 BRPM | TEMPERATURE: 98 F | DIASTOLIC BLOOD PRESSURE: 80 MMHG | HEART RATE: 78 BPM | SYSTOLIC BLOOD PRESSURE: 120 MMHG | OXYGEN SATURATION: 98 %

## 2019-03-01 DIAGNOSIS — J02.9 SORE THROAT: ICD-10-CM

## 2019-03-01 DIAGNOSIS — R68.89 FLU-LIKE SYMPTOMS: Primary | ICD-10-CM

## 2019-03-01 LAB
CONTROL LINE PRESENT WITH A CLEAR BACKGROUND (YES/NO): YES YES/NO
POCT INFLUENZA A: NEGATIVE
POCT INFLUENZA B: NEGATIVE

## 2019-03-01 PROCEDURE — 87880 STREP A ASSAY W/OPTIC: CPT | Performed by: PHYSICIAN ASSISTANT

## 2019-03-01 PROCEDURE — 99213 OFFICE O/P EST LOW 20 MIN: CPT | Performed by: PHYSICIAN ASSISTANT

## 2019-03-01 PROCEDURE — 87502 INFLUENZA DNA AMP PROBE: CPT | Performed by: PHYSICIAN ASSISTANT

## 2019-03-01 RX ORDER — AZITHROMYCIN 250 MG/1
TABLET, FILM COATED ORAL
Qty: 6 TABLET | Refills: 0 | Status: SHIPPED | OUTPATIENT
Start: 2019-03-01 | End: 2019-11-11 | Stop reason: ALTCHOICE

## 2019-03-01 RX ORDER — BENZONATATE 200 MG/1
200 CAPSULE ORAL 3 TIMES DAILY PRN
Qty: 30 CAPSULE | Refills: 0 | Status: SHIPPED | OUTPATIENT
Start: 2019-03-01 | End: 2019-11-11 | Stop reason: ALTCHOICE

## 2019-03-01 NOTE — PATIENT INSTRUCTIONS
Viral Respiratory Illness [Adult]  You have an Upper Respiratory Illness (URI) caused by a virus. This illness is contagious during the first few days.  It is spread through the air by coughing and sneezing or by direct contact (touching the sick person a © 2183-3281 [address], [address]. All rights reserved. This information is not intended as a substitute for professional medical care. Always follow your healthcare professional's instructions.

## 2019-03-01 NOTE — H&P
[address] [place] [address]    History and Physical    Ren Police Parul Patient Status:  No patient class for patient encounter    10/24/1975 MRN [id number]   Location [address] [place] [address], [address] [place] [address] Attending No att. providers found   [address] Component Value Date    WBC 5.7 02/20/2015    HGB 15.6 02/20/2015    HCT 47.9 02/20/2015     02/20/2015    CREATSERUM 1.09 02/20/2015    BUN 14 02/20/2015     02/20/2015    K 4.8 02/20/2015     02/20/2015    CO2 29 02/20/2015    GLU 83

## 2019-03-01 NOTE — PROGRESS NOTES
CHIEF COMPLAINT:   Patient presents with:  URI: sore throat, runny nose, achy, cough. fever last night 101.       HPI:      Janett Yates is a 41year old male who presents complaining of flu-like symptoms of fever to 101, malaise, fatigue, chills, myalg Past Medical History:   Diagnosis Date   • Acute tonsillitis    • Concussion    • Dizziness    • Foot pain     soft tissue   • Lipid screening 5/12/2012   • Lower back pain    • Otitis media of left ear    • Personal history of tobacco use, presenting Result Value Ref Range    Strep Grp A Screen neg Negative    Control Line Present with a clear background (yes/no) yes Yes/No    Kit Lot # [id number] Numeric    Kit Expiration Date 7/31/20 Date   INFLUENZA DNA AMP PROBE    Collection Time: 03/01/19  1:15

## 2019-05-21 DIAGNOSIS — M54.41 ACUTE RIGHT-SIDED LOW BACK PAIN WITH RIGHT-SIDED SCIATICA: Primary | ICD-10-CM

## 2019-11-11 ENCOUNTER — OFFICE VISIT (OUTPATIENT)
Dept: FAMILY MEDICINE CLINIC | Facility: CLINIC | Age: 44
End: 2019-11-11

## 2019-11-11 VITALS
TEMPERATURE: 99 F | BODY MASS INDEX: 30.25 KG/M2 | SYSTOLIC BLOOD PRESSURE: 108 MMHG | RESPIRATION RATE: 18 BRPM | HEART RATE: 84 BPM | WEIGHT: 218.5 LBS | DIASTOLIC BLOOD PRESSURE: 80 MMHG | HEIGHT: 71.26 IN

## 2019-11-11 DIAGNOSIS — M50.30 DEGENERATION OF CERVICAL INTERVERTEBRAL DISC: ICD-10-CM

## 2019-11-11 DIAGNOSIS — J06.9 ACUTE URI: Primary | ICD-10-CM

## 2019-11-11 PROBLEM — M54.12 RIGHT CERVICAL RADICULOPATHY: Status: RESOLVED | Noted: 2017-03-06 | Resolved: 2019-11-11

## 2019-11-11 PROBLEM — S43.431D LABRAL TEAR OF SHOULDER, RIGHT, SUBSEQUENT ENCOUNTER: Status: RESOLVED | Noted: 2017-03-06 | Resolved: 2019-11-11

## 2019-11-11 PROCEDURE — 99213 OFFICE O/P EST LOW 20 MIN: CPT | Performed by: NURSE PRACTITIONER

## 2019-11-11 RX ORDER — BENZONATATE 200 MG/1
200 CAPSULE ORAL 3 TIMES DAILY PRN
Qty: 21 CAPSULE | Refills: 1 | Status: SHIPPED | OUTPATIENT
Start: 2019-11-11 | End: 2019-12-27 | Stop reason: ALTCHOICE

## 2019-11-11 RX ORDER — AMOXICILLIN AND CLAVULANATE POTASSIUM 875; 125 MG/1; MG/1
1 TABLET, FILM COATED ORAL 2 TIMES DAILY
Qty: 20 TABLET | Refills: 0 | Status: SHIPPED | OUTPATIENT
Start: 2019-11-11 | End: 2019-11-21

## 2019-11-11 NOTE — PATIENT INSTRUCTIONS
Gargle with warm salt water solution 3-5 times daily. Dissolve 1/2 teaspoon salt in half cup of warm tap water. Gargle and spit.      Try a premixed saline nasal spray, available over the counter, such as [place] Nasal Spray (or generic equi

## 2019-11-11 NOTE — PROGRESS NOTES
Patient presents with:  Cough: x 2 weeks, mucus is a little yellow, snius drip, headachs      HPI:  Presents with approx 14 day history of chills (has not checked temps), cough with production of yellow colored sputum, sinus congestion, sore throat,  nasal Multiple Vitamins-Minerals (MULTIVITAMIN OR) Take by mouth daily. • Omega-3 Fatty Acids (FISH OIL) 875 MG Oral Cap Take by mouth daily. • Cholecalciferol (VITAMIN D OR) Take 1,000 Units by mouth daily.        • SERTRALINE HCL 50 MG Oral Tab TAKE Visit:  Requested Prescriptions     Signed Prescriptions Disp Refills   • Amoxicillin-Pot Clavulanate 875-125 MG Oral Tab 20 tablet 0     Sig: Take 1 tablet by mouth 2 (two) times daily for 10 days.    • benzonatate 200 MG Oral Cap 21 capsule 1     Sig: Anton Delgadillo

## 2019-11-15 ENCOUNTER — TELEPHONE (OUTPATIENT)
Dept: FAMILY MEDICINE CLINIC | Facility: CLINIC | Age: 44
End: 2019-11-15

## 2019-11-26 ENCOUNTER — TELEPHONE (OUTPATIENT)
Dept: FAMILY MEDICINE CLINIC | Facility: CLINIC | Age: 44
End: 2019-11-26

## 2019-11-26 NOTE — TELEPHONE ENCOUNTER
Patient is calling wanting to know if he can get a refill on his sinus infection medication. Stated his symptoms are still present and he is going on 2 weeks with symptoms.

## 2019-11-26 NOTE — TELEPHONE ENCOUNTER
Called LMOM to call back needs to be seen again no opening today can get in tomorrow with Reji RODRIGUEZ

## 2019-11-26 NOTE — TELEPHONE ENCOUNTER
Unfortunately will need to be seen for this. If no openings here please refer to [facility] or . Thanks.

## 2019-11-26 NOTE — TELEPHONE ENCOUNTER
LOV 11/11/2019 with SHAMAR Campbell   Pt reports he felt better after abx. Pt states same sx are returning over the last few days. Pt c/o PND with thick mucus, HAs, fatigue, body aches. Afebrile.  Pt states in past he's required two rounds of abx for sinus

## 2019-11-27 ENCOUNTER — OFFICE VISIT (OUTPATIENT)
Dept: FAMILY MEDICINE CLINIC | Facility: CLINIC | Age: 44
End: 2019-11-27

## 2019-11-27 VITALS
SYSTOLIC BLOOD PRESSURE: 110 MMHG | HEART RATE: 82 BPM | WEIGHT: 220.63 LBS | TEMPERATURE: 98 F | BODY MASS INDEX: 30.89 KG/M2 | RESPIRATION RATE: 20 BRPM | HEIGHT: 71 IN | OXYGEN SATURATION: 98 % | DIASTOLIC BLOOD PRESSURE: 68 MMHG

## 2019-11-27 DIAGNOSIS — J01.00 ACUTE MAXILLARY SINUSITIS, RECURRENCE NOT SPECIFIED: Primary | ICD-10-CM

## 2019-11-27 PROCEDURE — 99214 OFFICE O/P EST MOD 30 MIN: CPT | Performed by: FAMILY MEDICINE

## 2019-11-27 RX ORDER — DOXYCYCLINE HYCLATE 100 MG/1
100 CAPSULE ORAL 2 TIMES DAILY
Qty: 14 CAPSULE | Refills: 0 | Status: SHIPPED | OUTPATIENT
Start: 2019-11-27 | End: 2019-12-27 | Stop reason: ALTCHOICE

## 2019-11-27 NOTE — PATIENT INSTRUCTIONS
As of October 6th 2014, the Drug Enforcement Agency [facility]) is reclassifying all hydrocodone combination medications from Schedule III to Schedule II. This includes medications such as Norco, Vicodin, Lortab, Zohydro, and Vicoprofen.      What this means for often be managed with self-care. Self-care can keep sinuses moist and make you feel more comfortable. Remember to follow your doctor's instructions closely. This can make a big difference in getting your sinus problem under control.   Drink fluids  Drinking

## 2019-11-27 NOTE — PROGRESS NOTES
CHIEF COMPLAINT: Patient presents with:  Sinus Problem: facial pressure, cough with phlegm, body aches        HPI:     Brian Koehler is a 45year old male presents for sinus problems. Sabine Harp is a 46 yo M p/w sinus problems x over 1 week of symptoms.  He capsule 0   • benzonatate 200 MG Oral Cap Take 1 capsule (200 mg total) by mouth 3 (three) times daily as needed for cough. 21 capsule 1   • GLUCOSAMINE SULFATE OR Take 2 capsules by mouth daily.      • Multiple Vitamins-Minerals (MULTIVITAMIN OR) Take by m Left Ear: Ear canal normal. Tympanic membrane is not erythematous. Nose: Mucosal edema and rhinorrhea present. Right sinus exhibits maxillary sinus tenderness. Right sinus exhibits no frontal sinus tenderness.  Left sinus exhibits maxillary sinus tender times daily. Dispense: 14 capsule; Refill: 0      Meds This Visit:  Requested Prescriptions     Signed Prescriptions Disp Refills   • Doxycycline Hyclate 100 MG Oral Cap 14 capsule 0     Sig: Take 1 capsule (100 mg total) by mouth 2 (two) times daily.

## 2019-11-27 NOTE — TELEPHONE ENCOUNTER
Called LMOM to go [facility] or  tonight if possible other wise call on Friday to discuss this.

## 2019-12-02 ENCOUNTER — OFFICE VISIT (OUTPATIENT)
Dept: FAMILY MEDICINE CLINIC | Facility: CLINIC | Age: 44
End: 2019-12-02

## 2019-12-02 VITALS
SYSTOLIC BLOOD PRESSURE: 116 MMHG | WEIGHT: 218 LBS | RESPIRATION RATE: 19 BRPM | TEMPERATURE: 98 F | DIASTOLIC BLOOD PRESSURE: 72 MMHG | HEART RATE: 79 BPM | HEIGHT: 71 IN | BODY MASS INDEX: 30.52 KG/M2

## 2019-12-02 DIAGNOSIS — J32.9 RECURRENT SINUSITIS: Primary | ICD-10-CM

## 2019-12-02 PROCEDURE — 99213 OFFICE O/P EST LOW 20 MIN: CPT | Performed by: PHYSICIAN ASSISTANT

## 2019-12-02 RX ORDER — PREDNISONE 20 MG/1
40 TABLET ORAL DAILY
Qty: 6 TABLET | Refills: 0 | Status: SHIPPED | OUTPATIENT
Start: 2019-12-02 | End: 2019-12-05

## 2019-12-02 NOTE — PROGRESS NOTES
CC: Sinus pressure     HISTORY OF PRESENT ILLNESS  Ellen Bowles is a 45year old male who presents for evaluation of persistent sinusitis. He was seen here on 11/11 for cough. Started on augmentin.  Felt that he was doing better after that until he was o than 6 cm determined by computed tomography of lung      Past Surgical History:   Procedure Laterality Date   • Other surgical history  1996    urethra surgery, removal of cyst   • Vasectomy Bilateral 10/15/2015    Dr. Gael Snellen

## 2019-12-27 ENCOUNTER — OFFICE VISIT (OUTPATIENT)
Dept: FAMILY MEDICINE CLINIC | Facility: CLINIC | Age: 44
End: 2019-12-27

## 2019-12-27 VITALS
BODY MASS INDEX: 30.13 KG/M2 | WEIGHT: 220 LBS | SYSTOLIC BLOOD PRESSURE: 110 MMHG | RESPIRATION RATE: 14 BRPM | TEMPERATURE: 98 F | HEIGHT: 71.5 IN | OXYGEN SATURATION: 97 % | DIASTOLIC BLOOD PRESSURE: 60 MMHG | HEART RATE: 70 BPM

## 2019-12-27 DIAGNOSIS — F32.1 MODERATE SINGLE CURRENT EPISODE OF MAJOR DEPRESSIVE DISORDER (HCC): ICD-10-CM

## 2019-12-27 DIAGNOSIS — Z00.00 ROUTINE HEALTH MAINTENANCE: ICD-10-CM

## 2019-12-27 DIAGNOSIS — F41.1 GAD (GENERALIZED ANXIETY DISORDER): ICD-10-CM

## 2019-12-27 DIAGNOSIS — M79.671 PAIN OF RIGHT HEEL: ICD-10-CM

## 2019-12-27 DIAGNOSIS — J32.9 RECURRENT SINUSITIS: Primary | ICD-10-CM

## 2019-12-27 PROCEDURE — 99214 OFFICE O/P EST MOD 30 MIN: CPT | Performed by: FAMILY MEDICINE

## 2019-12-27 RX ORDER — AMOXICILLIN AND CLAVULANATE POTASSIUM 875; 125 MG/1; MG/1
1 TABLET, FILM COATED ORAL 2 TIMES DAILY
Qty: 28 TABLET | Refills: 0 | Status: SHIPPED | OUTPATIENT
Start: 2019-12-27 | End: 2020-01-10

## 2019-12-27 RX ORDER — PREDNISONE 20 MG/1
40 TABLET ORAL DAILY
Qty: 10 TABLET | Refills: 0 | Status: SHIPPED | OUTPATIENT
Start: 2019-12-27 | End: 2020-01-01

## 2019-12-27 NOTE — PROGRESS NOTES
Chief Complaint:  Patient presents with:  Sinus Problem: On going for about 2 months- not getting any better with Antibiotics or Prednisone    HPI:  This is a 45year old male patient presenting for Sinus Problem (On going for about 2 months- not getting a • VASECTOMY Bilateral 10/15/2015    Dr. Ruthie Cheadle      Family History   Problem Relation Age of Onset   • Other (CHF) Mother    • Heart Disease Father    • Other (Other) Father       Social History    Tobacco Use      Smoking status: Former Smoker        Pac and edema, pharynx without erythema, no tonsilar exudate  LUNGS: clear to auscultation bilaterally, no wheezes, rales or rhonchi, good air movement  CV: HRRR, no murmurs, no peripheral edema   EXTREMITIES: warm and well perfused, tenderness to underside of (14); Future  -     LIPID PANEL;  Future  -     TSH W REFLEX TO FREE T4; Future  -     VITAMIN D, 25-HYDROXY; Future    RTC 1-2 months for JUDITH Dillon DO  12/27/2019 3:51 PM  Family Medicine

## 2020-01-16 ENCOUNTER — HOSPITAL ENCOUNTER (OUTPATIENT)
Dept: GENERAL RADIOLOGY | Age: 45
Discharge: HOME | End: 2020-01-16
Attending: FAMILY MEDICINE
Payer: COMMERCIAL

## 2020-01-16 DIAGNOSIS — M79.671 PAIN OF RIGHT HEEL: ICD-10-CM

## 2020-01-16 PROCEDURE — 73630 X-RAY EXAM OF FOOT: CPT | Performed by: FAMILY MEDICINE

## 2020-01-22 ENCOUNTER — HOSPITAL ENCOUNTER (OUTPATIENT)
Dept: CT IMAGING | Age: 45
Discharge: HOME | End: 2020-01-22
Attending: OTOLARYNGOLOGY
Payer: COMMERCIAL

## 2020-01-22 DIAGNOSIS — J32.0 CHRONIC MAXILLARY SINUSITIS: ICD-10-CM

## 2020-01-22 PROCEDURE — 70486 CT MAXILLOFACIAL W/O DYE: CPT | Performed by: OTOLARYNGOLOGY

## 2020-01-24 NOTE — PROGRESS NOTES
Attempted to call pt; left detailed message on vm and sent [employer] msg.  ct sinsues showing left sided maxillary sinus cyst present recommend to proceed with allergy testing to further evaluate. Advised to call back with questions and to schedule IDT.

## 2020-01-24 NOTE — PROGRESS NOTES
Please inform ct sinsues showing left sided maxillary sinus cyst present recommend to proceed with allergy testing to further evaluate.

## 2020-02-06 ENCOUNTER — TELEPHONE (OUTPATIENT)
Dept: PODIATRY CLINIC | Facility: CLINIC | Age: 45
End: 2020-02-06

## 2020-02-06 ENCOUNTER — OFFICE VISIT (OUTPATIENT)
Dept: PODIATRY CLINIC | Facility: CLINIC | Age: 45
End: 2020-02-06

## 2020-02-06 DIAGNOSIS — M77.31 CALCANEAL SPUR OF RIGHT FOOT: ICD-10-CM

## 2020-02-06 DIAGNOSIS — M72.2 PLANTAR FASCIITIS: Primary | ICD-10-CM

## 2020-02-06 PROCEDURE — L4397 STATIC OR DYNAMI AFO PRE OTS: HCPCS | Performed by: PODIATRIST

## 2020-02-06 PROCEDURE — 99203 OFFICE O/P NEW LOW 30 MIN: CPT | Performed by: PODIATRIST

## 2020-02-06 RX ORDER — MELOXICAM 15 MG/1
15 TABLET ORAL DAILY
Qty: 30 TABLET | Refills: 1 | Status: SHIPPED | OUTPATIENT
Start: 2020-02-06 | End: 2020-02-06

## 2020-02-06 RX ORDER — MELOXICAM 15 MG/1
15 TABLET ORAL DAILY
Qty: 30 TABLET | Refills: 1 | Status: SHIPPED | OUTPATIENT
Start: 2020-02-06 | End: 2021-08-31

## 2020-02-06 NOTE — PROGRESS NOTES
Ama Oliva is a 45year old male. Patient presents with:  New Patient: Right foot pain for the past 6 months. 7/10 for pain, Xrays of right foot 3 views done on 12/27/2019.         HPI:     Presents today with his right foot pain this past 6 months joleen Marital status:       Spouse name: Not on file      Number of children: Not on file      Years of education: Not on file      Highest education level: Not on file    Occupational History      Occupation:     Tobacco Use      Smoking status fasciitis    Calcaneal spur of right foot        Plan: Diagnostic ultrasound was done this date to more thoroughly evaluate the fascia and the fascia was thickened to about 5.5 mm on the right side.   We dispensed out the [facility] brochure for stretching

## 2020-02-17 ENCOUNTER — OFFICE VISIT (OUTPATIENT)
Dept: FAMILY MEDICINE CLINIC | Facility: CLINIC | Age: 45
End: 2020-02-17

## 2020-02-17 VITALS
DIASTOLIC BLOOD PRESSURE: 76 MMHG | TEMPERATURE: 98 F | RESPIRATION RATE: 16 BRPM | SYSTOLIC BLOOD PRESSURE: 128 MMHG | OXYGEN SATURATION: 98 % | HEART RATE: 82 BPM | HEIGHT: 72 IN | WEIGHT: 215 LBS | BODY MASS INDEX: 29.12 KG/M2

## 2020-02-17 DIAGNOSIS — J01.00 ACUTE NON-RECURRENT MAXILLARY SINUSITIS: Primary | ICD-10-CM

## 2020-02-17 DIAGNOSIS — J02.9 SORE THROAT: ICD-10-CM

## 2020-02-17 LAB
CONTROL LINE PRESENT WITH A CLEAR BACKGROUND (YES/NO): YES YES/NO
KIT LOT #: NORMAL NUMERIC
STREP GRP A CUL-SCR: NEGATIVE

## 2020-02-17 PROCEDURE — 87880 STREP A ASSAY W/OPTIC: CPT | Performed by: NURSE PRACTITIONER

## 2020-02-17 PROCEDURE — 99213 OFFICE O/P EST LOW 20 MIN: CPT | Performed by: NURSE PRACTITIONER

## 2020-02-17 RX ORDER — AMOXICILLIN AND CLAVULANATE POTASSIUM 875; 125 MG/1; MG/1
1 TABLET, FILM COATED ORAL 2 TIMES DAILY
Qty: 20 TABLET | Refills: 0 | Status: SHIPPED | OUTPATIENT
Start: 2020-02-17 | End: 2020-02-27

## 2020-02-17 NOTE — PROGRESS NOTES
CHIEF COMPLAINT:   No chief complaint on file. HPI:   Janett Yates is a 45year old male who presents for cold symptoms for  2  weeks. Symptoms have progressed into sinus congestion and been worsening since onset.  Sinus congestion/pain is described Past Surgical History:   Procedure Laterality Date   • OTHER SURGICAL HISTORY  1996    urethra surgery, removal of cyst   • VASECTOMY Bilateral 10/15/2015    Dr. Meryle Nailer      Family History   Problem Relation Age of Onset   • Other (CHF) Mother    • Heart Kerline Cadena ASSESSMENT AND PLAN:   Lily Chase is a 45year old male who presents with No chief complaint on file. . Symptoms are consistent with:      ASSESSMENT:  Sore throat  Acute non-recurrent maxillary sinusitis  (primary encounter diagnosis)      PLAN: Meds · An expectorant with guaifenesin may help thin nasal mucus and help your sinuses drain fluids. · You can use an over-the-counter decongestant, unless a similar medicine was prescribed to you.  Nasal sprays work the fastest. Use one that contains phenyleph Here are steps you can take to help prevent an infection:  · Keep good hand washing habits. · Don’t have close contact with people who have sore throats, colds, or other upper respiratory infections. · Don’t smoke, and stay away from secondhand smoke.   ·

## 2020-02-18 NOTE — TELEPHONE ENCOUNTER
Chief Complaint: Mela Muse is a  88 y.o. female, initially referred by No ref. provider found , who is here today for a postoperative visit.    History of Present Illness:  In the interim,Mela Muse has had the following procedure and resultant pathology report: She is undergone a needle localized left breast lumpectomy.  She was found to have a 2 cm grade 2 invasive lobular cancer with clear margins.  We did not evaluate her lymph nodes.    She has noted no redness, warmth,drainage, swelling at the incision site. Denies fever or chills.      Current Outpatient Medications:   •  acetaminophen (TYLENOL) 500 MG tablet, Take 1,000 mg by mouth Every 6 (Six) Hours As Needed for Mild Pain ., Disp: , Rfl:   •  amLODIPine (NORVASC) 2.5 MG tablet, Take 2.5 mg by mouth Daily., Disp: , Rfl:   •  HYDROcodone-acetaminophen (NORCO) 5-325 MG per tablet, Take 1-2 tablets by mouth Every 4 (Four) Hours As Needed (Pain)., Disp: 8 tablet, Rfl: 0  •  levothyroxine (SYNTHROID, LEVOTHROID) 75 MCG tablet, Take 75 mcg by mouth Daily., Disp: , Rfl:   •  meclizine (ANTIVERT) 12.5 MG tablet, Take 12.5 mg by mouth As Needed for Dizziness., Disp: , Rfl:   Physical examination  Left breast- the incision along the superior edge of the areola is healing nicely.  She does have some fluid superior to this incision but the skin is not red in any way and there is no drainage from the incision.  Assessment:  Left breast cancer status post breast conserving surgery.  She appears to be healing well and has appointments with the medical and the radiation oncologist.    Plan:  I would like to see her back in 2 months.          EMR Dragon/transcription disclaimer:    Much of this encounter note is an electronic transcription/translocation of spoken language to printed text.  The electronic translation of spoken language may permit erroneous, or at times, nonsensical words or phrases to be inadvertently transcribed.  Although I have reviewed  Left message for patient that referral to Dr. Tonio Mcdowell has been approved. the note from such areas, some may still exist.

## 2020-02-26 ENCOUNTER — OFFICE VISIT (OUTPATIENT)
Dept: FAMILY MEDICINE CLINIC | Facility: CLINIC | Age: 45
End: 2020-02-26

## 2020-02-26 VITALS
RESPIRATION RATE: 16 BRPM | BODY MASS INDEX: 30.66 KG/M2 | TEMPERATURE: 99 F | WEIGHT: 219 LBS | HEART RATE: 80 BPM | HEIGHT: 71 IN | SYSTOLIC BLOOD PRESSURE: 110 MMHG | DIASTOLIC BLOOD PRESSURE: 80 MMHG

## 2020-02-26 DIAGNOSIS — R10.31 RIGHT GROIN PAIN: Primary | ICD-10-CM

## 2020-02-26 PROCEDURE — 99213 OFFICE O/P EST LOW 20 MIN: CPT | Performed by: NURSE PRACTITIONER

## 2020-02-26 RX ORDER — NAPROXEN 500 MG/1
500 TABLET ORAL 2 TIMES DAILY WITH MEALS
Qty: 14 TABLET | Refills: 0 | Status: SHIPPED | OUTPATIENT
Start: 2020-02-26 | End: 2021-08-31

## 2020-02-27 ENCOUNTER — OFFICE VISIT (OUTPATIENT)
Dept: PODIATRY CLINIC | Facility: CLINIC | Age: 45
End: 2020-02-27

## 2020-02-27 DIAGNOSIS — M77.31 CALCANEAL SPUR OF RIGHT FOOT: ICD-10-CM

## 2020-02-27 DIAGNOSIS — M72.2 PLANTAR FASCIITIS: Primary | ICD-10-CM

## 2020-02-27 PROCEDURE — 99213 OFFICE O/P EST LOW 20 MIN: CPT | Performed by: PODIATRIST

## 2020-02-27 NOTE — PROGRESS NOTES
Antonia Hall is a 45year old male. Patient presents with: Follow - Up: right foot - has improved, but still hurts - on and off pain scale 4/10 - denies taking any pain medications.         HPI:     Shelby Murphy today for follow-up regarding his heel pain bett VASECTOMY Bilateral 10/15/2015    Dr. Yasemin Solis      Family History   Problem Relation Age of Onset   • Other (CHF) Mother    • Heart Disease Father    • Other (Other) Father       Social History    Socioeconomic History      Marital status:       Spous normal.    Has tenderness upon palpation of the medial tuberosity region of the heel consistent with chronic proximal plantar fasciitis.       ASSESSMENT AND PLAN:   Diagnoses and all orders for this visit:    Plantar fasciitis    Calcaneal spur of right fo

## 2020-03-02 ENCOUNTER — TELEPHONE (OUTPATIENT)
Dept: FAMILY MEDICINE CLINIC | Facility: CLINIC | Age: 45
End: 2020-03-02

## 2020-03-02 NOTE — TELEPHONE ENCOUNTER
Zuleyka Bell, chiropractic visits you requested for patient's right groin pain have been denied by [employer]. Do you want me contact patient and see if he wants to try PT instead?

## 2020-03-03 NOTE — TELEPHONE ENCOUNTER
Left message for patient that we could place referral for PT if he would like for the right groin pain. Asked patient to return our call.

## 2020-03-03 NOTE — TELEPHONE ENCOUNTER
Patient called back he prefers chiropractic treatments for this issue. He has seen them in the past and it worked well for him. He is going to call his insurance and see if he can appeal this decision.

## 2020-03-17 ENCOUNTER — TELEPHONE (OUTPATIENT)
Dept: FAMILY MEDICINE CLINIC | Facility: CLINIC | Age: 45
End: 2020-03-17

## 2020-03-17 DIAGNOSIS — R10.31 RIGHT GROIN PAIN: Primary | ICD-10-CM

## 2020-03-17 NOTE — TELEPHONE ENCOUNTER
Referral request PT    Evaluate and treat  8 visits    Patient seen by Hayden Spivey NP 2/26/2020    Office notes from Hayden Spivey NP 2/26/2020  HPI:  Presents with approx 2 month history of right groin pain which is now migrating to pubic area.  Stated pain

## 2020-03-17 NOTE — TELEPHONE ENCOUNTER
Patient had been given a referral to a chiropractor which was denied. He is now requesting PT order instead.

## 2020-04-02 DIAGNOSIS — F32.1 MODERATE SINGLE CURRENT EPISODE OF MAJOR DEPRESSIVE DISORDER (HCC): ICD-10-CM

## 2020-04-02 DIAGNOSIS — F41.1 GAD (GENERALIZED ANXIETY DISORDER): ICD-10-CM

## 2020-06-10 ENCOUNTER — TELEPHONE (OUTPATIENT)
Dept: FAMILY MEDICINE CLINIC | Facility: CLINIC | Age: 45
End: 2020-06-10

## 2020-06-10 RX ORDER — CYCLOBENZAPRINE HCL 10 MG
10 TABLET ORAL 3 TIMES DAILY
Qty: 30 TABLET | Refills: 0 | Status: SHIPPED | OUTPATIENT
Start: 2020-06-10 | End: 2020-06-19

## 2020-06-10 NOTE — TELEPHONE ENCOUNTER
Flexeril sent to local pharmacy, but will hold off on prednisone with coronavirus because of potential steroid risk. If no improvement in the next 1 or 2 days, we can either do an in office or video visit to follow-up with this.

## 2020-06-10 NOTE — TELEPHONE ENCOUNTER
C/o pain to shoulder and neck of right side for last week.  Has had this before and it responds to muscle relaxer's, Flexeril or steroids usually help this

## 2020-06-17 ENCOUNTER — PATIENT MESSAGE (OUTPATIENT)
Dept: FAMILY MEDICINE CLINIC | Facility: CLINIC | Age: 45
End: 2020-06-17

## 2020-06-17 NOTE — TELEPHONE ENCOUNTER
From: Justin Ambrocio  To:  Jasen Peters DO  Sent: 6/17/2020 11:17 AM CDT  Subject: Referral Request    Hi Dr. Palmira Murrieta:    I recently got a prescription for flexiril but no anti-inflamatory or steroid for some neck pain that is causing some really bad head

## 2020-06-18 NOTE — TELEPHONE ENCOUNTER
Virtual/Telephone Check-In    Ren Teran verbally consents to a Virtual/Telephone Check-In service on 6/18/2020. Patient understands and accepts financial responsibility for any deductible, co-insurance and/or co-pays associated with this service.

## 2020-06-19 ENCOUNTER — VIRTUAL PHONE E/M (OUTPATIENT)
Dept: FAMILY MEDICINE CLINIC | Facility: CLINIC | Age: 45
End: 2020-06-19

## 2020-06-19 DIAGNOSIS — G89.29 CHRONIC UPPER BACK PAIN: ICD-10-CM

## 2020-06-19 DIAGNOSIS — M54.81 OCCIPITAL NEURALGIA OF RIGHT SIDE: Primary | ICD-10-CM

## 2020-06-19 DIAGNOSIS — M54.9 CHRONIC UPPER BACK PAIN: ICD-10-CM

## 2020-06-19 PROCEDURE — 99213 OFFICE O/P EST LOW 20 MIN: CPT | Performed by: PHYSICIAN ASSISTANT

## 2020-06-19 RX ORDER — METHOCARBAMOL 750 MG/1
TABLET, FILM COATED ORAL 3 TIMES DAILY
Qty: 20 TABLET | Refills: 0 | Status: SHIPPED | OUTPATIENT
Start: 2020-06-19

## 2020-06-19 RX ORDER — METHYLPREDNISOLONE 4 MG/1
TABLET ORAL
Qty: 1 KIT | Refills: 0 | Status: SHIPPED | OUTPATIENT
Start: 2020-06-19 | End: 2021-08-31

## 2020-06-24 ENCOUNTER — TELEPHONE (OUTPATIENT)
Dept: FAMILY MEDICINE CLINIC | Facility: CLINIC | Age: 45
End: 2020-06-24

## 2020-06-24 NOTE — TELEPHONE ENCOUNTER
Pt calling on status of his referral as he is alot of pain for Dr Erma Hitchcock.  Placed on 6/19/20 has [employer] and is still in Open status

## 2020-06-24 NOTE — TELEPHONE ENCOUNTER
Sent a message to [facility] Referral Dept informed them that status of this referral was changed to URGENT

## 2020-08-05 ENCOUNTER — PATIENT MESSAGE (OUTPATIENT)
Dept: FAMILY MEDICINE CLINIC | Facility: CLINIC | Age: 45
End: 2020-08-05

## 2020-08-05 DIAGNOSIS — M54.81 OCCIPITAL NEURALGIA OF RIGHT SIDE: Primary | ICD-10-CM

## 2020-08-05 DIAGNOSIS — M54.9 CHRONIC UPPER BACK PAIN: ICD-10-CM

## 2020-08-05 DIAGNOSIS — G89.29 CHRONIC UPPER BACK PAIN: ICD-10-CM

## 2020-08-05 NOTE — TELEPHONE ENCOUNTER
From: Silvia Schmidt  To: MICHAEL Perales  Sent: 8/5/2020 7:51 AM CDT  Subject: Referral Request    Good morning! On Monday I used up my last appointment for my referral to the chiropractor.  I have improved quite a bit, but feel like I still have

## 2020-08-12 NOTE — TELEPHONE ENCOUNTER
TC to patient to let him know I have reached out to Dr. Reardon Speaks, [place] twice to ask for previous records so I can get additional visits approved and I have heard nothing back. Patient is going to try to reach out to them for me. He is given our fax number.

## 2020-08-29 ENCOUNTER — HOSPITAL ENCOUNTER (OUTPATIENT)
Age: 45
Discharge: HOME | End: 2020-08-29
Payer: COMMERCIAL

## 2020-08-29 VITALS
DIASTOLIC BLOOD PRESSURE: 77 MMHG | OXYGEN SATURATION: 98 % | HEIGHT: 72 IN | TEMPERATURE: 98 F | HEART RATE: 92 BPM | SYSTOLIC BLOOD PRESSURE: 129 MMHG | BODY MASS INDEX: 29.8 KG/M2 | WEIGHT: 220 LBS | RESPIRATION RATE: 18 BRPM

## 2020-08-29 DIAGNOSIS — H57.89 REDNESS OF RIGHT EYE: Primary | ICD-10-CM

## 2020-08-29 PROCEDURE — 99213 OFFICE O/P EST LOW 20 MIN: CPT

## 2020-08-29 PROCEDURE — 99204 OFFICE O/P NEW MOD 45 MIN: CPT

## 2020-08-29 RX ORDER — TETRACAINE HYDROCHLORIDE 5 MG/ML
1 SOLUTION OPHTHALMIC ONCE
Status: COMPLETED | OUTPATIENT
Start: 2020-08-29 | End: 2020-08-29

## 2020-08-29 RX ORDER — ERYTHROMYCIN 5 MG/G
1 OINTMENT OPHTHALMIC EVERY 6 HOURS
Qty: 1 G | Refills: 0 | Status: SHIPPED | OUTPATIENT
Start: 2020-08-29 | End: 2020-09-05

## 2020-08-29 NOTE — ED PROVIDER NOTES
Patient Seen in: [facility]      History   Patient presents with:  Eye Problem    Stated Complaint: RIGHT EYE IRRITATION     HPI    CHIEF COMPLAINT: Right eye irritation since waking this morning     HISTORY OF PRESENT ILLNESS: of cyst   • VASECTOMY Bilateral 10/15/2015    Dr. Jurado Persons                Family history reviewed with patient/caregiver and is not pertinent to presenting problem.     Social History    Tobacco Use      Smoking status: Former Smoker        Packs/day: 0.50 to display               MDM     This is a well-appearing 82-year-old male who presents with right eye redness since waking this morning. No evidence of corneal abrasion or foreign body. Patient is prescribed erythromycin ointment to use as directed.   If

## 2020-10-14 DIAGNOSIS — F41.1 GAD (GENERALIZED ANXIETY DISORDER): ICD-10-CM

## 2020-10-14 DIAGNOSIS — F32.1 MODERATE SINGLE CURRENT EPISODE OF MAJOR DEPRESSIVE DISORDER (HCC): ICD-10-CM

## 2020-10-15 NOTE — TELEPHONE ENCOUNTER
DALILA Juárez Pt is calling on status of his med Sertraline.  He is all out and is going out of town later this morning

## 2020-10-23 NOTE — TELEPHONE ENCOUNTER
Medication was refilled for 90 days however patient still needs a physical. Called and spoke with patient and he will call back to schedule he needed to check his schedule for November.

## 2020-10-28 ENCOUNTER — TELEPHONE (OUTPATIENT)
Dept: FAMILY MEDICINE CLINIC | Facility: CLINIC | Age: 45
End: 2020-10-28

## 2020-10-28 ENCOUNTER — APPOINTMENT (OUTPATIENT)
Dept: GENERAL RADIOLOGY | Age: 45
End: 2020-10-28
Attending: PHYSICIAN ASSISTANT
Payer: COMMERCIAL

## 2020-10-28 ENCOUNTER — HOSPITAL ENCOUNTER (OUTPATIENT)
Age: 45
Discharge: HOME | End: 2020-10-28
Payer: COMMERCIAL

## 2020-10-28 VITALS
OXYGEN SATURATION: 97 % | DIASTOLIC BLOOD PRESSURE: 85 MMHG | TEMPERATURE: 98 F | RESPIRATION RATE: 16 BRPM | HEART RATE: 71 BPM | SYSTOLIC BLOOD PRESSURE: 125 MMHG

## 2020-10-28 DIAGNOSIS — S82.891A CLOSED FRACTURE OF RIGHT ANKLE, INITIAL ENCOUNTER: ICD-10-CM

## 2020-10-28 DIAGNOSIS — S82.891A CLOSED FRACTURE OF RIGHT ANKLE, INITIAL ENCOUNTER: Primary | ICD-10-CM

## 2020-10-28 DIAGNOSIS — S93.601A FOOT SPRAIN, RIGHT, INITIAL ENCOUNTER: Primary | ICD-10-CM

## 2020-10-28 PROCEDURE — 99214 OFFICE O/P EST MOD 30 MIN: CPT

## 2020-10-28 PROCEDURE — 73610 X-RAY EXAM OF ANKLE: CPT | Performed by: PHYSICIAN ASSISTANT

## 2020-10-28 PROCEDURE — 73630 X-RAY EXAM OF FOOT: CPT | Performed by: PHYSICIAN ASSISTANT

## 2020-10-28 RX ORDER — ARM BRACE
EACH MISCELLANEOUS
Qty: 1 EACH | Refills: 0 | Status: SHIPPED | OUTPATIENT
Start: 2020-10-28

## 2020-10-28 NOTE — TELEPHONE ENCOUNTER
Referral request Dr. Stevie Davis M.D. Patient was seen in [facility] 10/28/2020  Notes from visit in [facility] 10/28/2020  CONCLUSION:  There is marked soft tissue swelling laterally.   There is a small chip fracture measuring 3 mm adjacent to

## 2020-10-28 NOTE — ED PROVIDER NOTES
Patient Seen in: [facility]      History   Patient presents with:   Foot Injury    Stated Complaint: right foot injury x yesterday    HPI    79-year-old male here with complaint of pain and swelling to his right foot and ankle after he rol Systems    Positive for stated complaint: right foot injury x yesterday  Other systems are as noted in HPI. Constitutional and vital signs reviewed. All other systems reviewed and negative except as noted above.     Physical Exam     ED Triage Vitals Right (cpt=73610)    Result Date: 10/28/2020  PROCEDURE:  XR ANKLE (MIN 3 VIEWS), RIGHT (CPT=73610)  TECHNIQUE:  Three views were obtained. COMPARISON:  None.   INDICATIONS:  right foot injury x yesterday  PATIENT STATED HISTORY: (As transcribed by Felicita Buchanan that he will go and  a Cam boot at the Ortho supply store and follow-up with the Ortho as given. MDM     Clinical Impression: ankle fracture right/foot sprain right  Course of Treatment: Rest ice compression elevation.   Take Motrin and/or T

## 2020-10-28 NOTE — TELEPHONE ENCOUNTER
Patient went to [facility]mediate care and has a broken ankle. On the discharge paperwork patient was referred to Dr. Huey Aase but needs referral entered.  Patient was also advised to get walking boot but didn't receive a prescription for that

## 2020-10-28 NOTE — ED INITIAL ASSESSMENT (HPI)
Pt. Was walking down the stairs when his foot slipped and he injured his rt foot and ankle yesterday.   Now painful and swollen

## 2020-11-21 ENCOUNTER — APPOINTMENT (OUTPATIENT)
Dept: GENERAL RADIOLOGY | Age: 45
End: 2020-11-21
Attending: PHYSICIAN ASSISTANT
Payer: COMMERCIAL

## 2020-11-21 ENCOUNTER — HOSPITAL ENCOUNTER (OUTPATIENT)
Age: 45
Discharge: HOME | End: 2020-11-21
Payer: COMMERCIAL

## 2020-11-21 VITALS
RESPIRATION RATE: 16 BRPM | OXYGEN SATURATION: 97 % | SYSTOLIC BLOOD PRESSURE: 122 MMHG | TEMPERATURE: 99 F | DIASTOLIC BLOOD PRESSURE: 83 MMHG | HEART RATE: 73 BPM

## 2020-11-21 DIAGNOSIS — R52 GENERALIZED BODY ACHES: ICD-10-CM

## 2020-11-21 DIAGNOSIS — Z20.822 CLOSE EXPOSURE TO COVID-19 VIRUS: Primary | ICD-10-CM

## 2020-11-21 DIAGNOSIS — R05.9 COUGH: ICD-10-CM

## 2020-11-21 PROCEDURE — 99213 OFFICE O/P EST LOW 20 MIN: CPT

## 2020-11-21 PROCEDURE — 71046 X-RAY EXAM CHEST 2 VIEWS: CPT | Performed by: PHYSICIAN ASSISTANT

## 2020-11-21 NOTE — ED INITIAL ASSESSMENT (HPI)
Patient presents with IC with cc of headaches,muscleaches x 3 days. Had covid + exposure last Saturday. SOB with going up stairs.

## 2020-11-21 NOTE — ED PROVIDER NOTES
Patient Seen in: [facility]      History   Patient presents with:  Headache  Difficulty Breathing    Stated Complaint: HEADACES/MUSCLE ACHE/CHILLS/SHORTNESS OF BREATH    HPI  Maryanne Zayas is a 81-year-old male who presents today with concern for except as noted above.     Physical Exam     ED Triage Vitals [11/21/20 1331]   /83   Pulse 73   Resp 16   Temp 98.6 °F (37 °C)   Temp src Oral   SpO2 97 %   O2 Device None (Room air)       Current:/83   Pulse 73   Temp 98.6 °F (37 °C) (Oral) advised of his imaging findings. Patient is informed of the current turnaround time for results and current method of reporting. In the meantime, self isolation is recommended.   If positive, the patient will have to remain in self isolation until 10 days

## 2021-01-14 ENCOUNTER — PATIENT MESSAGE (OUTPATIENT)
Dept: FAMILY MEDICINE CLINIC | Facility: CLINIC | Age: 46
End: 2021-01-14

## 2021-01-14 NOTE — TELEPHONE ENCOUNTER
From: Lily Chase  To: Edison Lezama DO  Sent: 1/14/2021 12:13 PM CST  Subject: Prescription Question    I was told I need to come in for a visit to refill my prescription of Sertraline. Is it possible for an evisit?  I will be out of my prescription b

## 2021-01-18 DIAGNOSIS — M50.30 DEGENERATION OF CERVICAL INTERVERTEBRAL DISC: Primary | ICD-10-CM

## 2021-01-21 PROBLEM — F41.1 GAD (GENERALIZED ANXIETY DISORDER): Status: ACTIVE | Noted: 2021-01-21

## 2021-01-21 PROBLEM — F32.1 MODERATE SINGLE CURRENT EPISODE OF MAJOR DEPRESSIVE DISORDER (HCC): Status: ACTIVE | Noted: 2021-01-21

## 2021-01-21 NOTE — PROGRESS NOTES
Virtual Telephone Check-In    Shimon Green verbally consents to a Virtual/Telephone Check-In visit on 01/21/21. Patient has been referred to the [facility] website at [email or web address] to review the yearly Consent to Treat document.     Patient underst

## 2021-02-04 ENCOUNTER — HOSPITAL ENCOUNTER (OUTPATIENT)
Dept: CT IMAGING | Facility: HOSPITAL | Age: 46
Discharge: HOME | End: 2021-02-04
Attending: FAMILY MEDICINE
Payer: COMMERCIAL

## 2021-02-04 DIAGNOSIS — R91.1 PULMONARY NODULE LESS THAN 6 CM DETERMINED BY COMPUTED TOMOGRAPHY OF LUNG: ICD-10-CM

## 2021-02-04 DIAGNOSIS — Z87.891 HISTORY OF TOBACCO USE, PRESENTING HAZARDS TO HEALTH: ICD-10-CM

## 2021-02-04 PROCEDURE — 71250 CT THORAX DX C-: CPT | Performed by: FAMILY MEDICINE

## 2021-02-17 ENCOUNTER — APPOINTMENT (OUTPATIENT)
Dept: CT IMAGING | Age: 46
End: 2021-02-17
Attending: EMERGENCY MEDICINE
Payer: COMMERCIAL

## 2021-02-17 ENCOUNTER — HOSPITAL ENCOUNTER (EMERGENCY)
Age: 46
Discharge: HOME | End: 2021-02-17
Attending: EMERGENCY MEDICINE
Payer: COMMERCIAL

## 2021-02-17 VITALS
HEART RATE: 74 BPM | SYSTOLIC BLOOD PRESSURE: 137 MMHG | BODY MASS INDEX: 30.48 KG/M2 | TEMPERATURE: 98 F | WEIGHT: 225 LBS | DIASTOLIC BLOOD PRESSURE: 87 MMHG | HEIGHT: 72 IN | RESPIRATION RATE: 20 BRPM | OXYGEN SATURATION: 98 %

## 2021-02-17 DIAGNOSIS — S09.90XA CLOSED HEAD INJURY, INITIAL ENCOUNTER: Primary | ICD-10-CM

## 2021-02-17 DIAGNOSIS — H57.02 ANISOCORIA: ICD-10-CM

## 2021-02-17 PROCEDURE — 99284 EMERGENCY DEPT VISIT MOD MDM: CPT

## 2021-02-17 PROCEDURE — 70450 CT HEAD/BRAIN W/O DYE: CPT | Performed by: EMERGENCY MEDICINE

## 2021-02-23 ENCOUNTER — TELEPHONE (OUTPATIENT)
Dept: FAMILY MEDICINE CLINIC | Facility: CLINIC | Age: 46
End: 2021-02-23

## 2021-02-23 NOTE — TELEPHONE ENCOUNTER
Patient was seen in the ER on 2/17. Called and spoke to patient and he stated he does not feel like he needs to be seen in the office.

## 2021-04-01 DIAGNOSIS — Z23 NEED FOR VACCINATION: ICD-10-CM

## 2021-04-02 ENCOUNTER — IMMUNIZATION (OUTPATIENT)
Dept: LAB | Age: 46
End: 2021-04-02
Attending: HOSPITALIST
Payer: COMMERCIAL

## 2021-04-02 DIAGNOSIS — Z23 NEED FOR VACCINATION: Primary | ICD-10-CM

## 2021-04-02 PROCEDURE — 0001A SARSCOV2 VAC 30MCG/0.3ML IM: CPT

## 2021-04-23 ENCOUNTER — IMMUNIZATION (OUTPATIENT)
Dept: LAB | Age: 46
End: 2021-04-23
Attending: HOSPITALIST
Payer: COMMERCIAL

## 2021-04-23 DIAGNOSIS — Z23 NEED FOR VACCINATION: Primary | ICD-10-CM

## 2021-04-23 PROCEDURE — 0002A SARSCOV2 VAC 30MCG/0.3ML IM: CPT

## 2021-05-06 ENCOUNTER — PATIENT MESSAGE (OUTPATIENT)
Dept: FAMILY MEDICINE CLINIC | Facility: CLINIC | Age: 46
End: 2021-05-06

## 2021-05-06 DIAGNOSIS — S43.431D SUPERIOR GLENOID LABRUM LESION OF RIGHT SHOULDER, SUBSEQUENT ENCOUNTER: Primary | ICD-10-CM

## 2021-05-06 NOTE — TELEPHONE ENCOUNTER
From: Starla Butterfield  To: Manohar Bennett DO  Sent: 5/6/2021 8:20 AM CDT  Subject: Referral Request    I have a slap tear in my right shoulder and have completed physical therapy in the past but have known surgery will be needed.  I previously had a consult

## 2021-07-19 DIAGNOSIS — F41.1 GAD (GENERALIZED ANXIETY DISORDER): ICD-10-CM

## 2021-07-19 DIAGNOSIS — F32.1 MODERATE SINGLE CURRENT EPISODE OF MAJOR DEPRESSIVE DISORDER (HCC): ICD-10-CM

## 2021-07-20 NOTE — TELEPHONE ENCOUNTER
Refill request for:    Requested Prescriptions     Pending Prescriptions Disp Refills   • SERTRALINE HCL 50 MG Oral Tab [Pharmacy Med Name: SERTRALINE 50MG TABLETS] 90 tablet 1     Sig: TAKE 1 TABLET(50 MG) BY MOUTH DAILY        Last Prescribed Quantity Re

## 2021-07-21 NOTE — TELEPHONE ENCOUNTER
Patient refused to make appointment @this time he said he would call back,Keagan notified that his medication is pending review and not ready for  and we do need him to call back to schedule a appointment

## 2021-07-22 DIAGNOSIS — F41.1 GAD (GENERALIZED ANXIETY DISORDER): ICD-10-CM

## 2021-07-22 DIAGNOSIS — F32.1 MODERATE SINGLE CURRENT EPISODE OF MAJOR DEPRESSIVE DISORDER (HCC): ICD-10-CM

## 2021-07-22 NOTE — TELEPHONE ENCOUNTER
Patient upset that this was not filled wanted to know if he can get an emergency refill as he is going out of town right now and needs to get this filled

## 2021-07-22 NOTE — TELEPHONE ENCOUNTER
Pt had 1/21/21 virtual visit for depression. 3-6 mo f/u for physical advised.     Routed to Dr. Palmira Murrieta - meme Schmitz

## 2021-07-22 NOTE — TELEPHONE ENCOUNTER
Pt states he went to his pharmacy to pick his medication up and he had no refills. Advised pt from encounter on 07/19/21 medication was denied pt needed to schedule an appointment before any further refills.  Pt refused on scheduling an appointment pt state

## 2021-08-02 PROBLEM — S43.431D LABRAL TEAR OF SHOULDER, RIGHT, SUBSEQUENT ENCOUNTER: Status: ACTIVE | Noted: 2021-08-02

## 2021-08-02 PROBLEM — M77.11 LATERAL EPICONDYLITIS, RIGHT ELBOW: Status: ACTIVE | Noted: 2021-08-02

## 2021-08-03 ENCOUNTER — TELEPHONE (OUTPATIENT)
Dept: FAMILY MEDICINE CLINIC | Facility: CLINIC | Age: 46
End: 2021-08-03

## 2021-08-03 DIAGNOSIS — M50.30 DEGENERATION OF CERVICAL INTERVERTEBRAL DISC: Primary | ICD-10-CM

## 2021-08-03 DIAGNOSIS — S43.431D SUPERIOR GLENOID LABRUM LESION OF RIGHT SHOULDER, SUBSEQUENT ENCOUNTER: ICD-10-CM

## 2021-08-05 NOTE — TELEPHONE ENCOUNTER
Referral request OT    Evaluate and treat  8 visits   Office notes from Dr. Juan Ayala M.D.  Kieran Young:   He has degenerative disc disease of the cervical spine with some radicular symptoms with foraminal stenosis on the right side particularly at

## 2021-08-05 NOTE — TELEPHONE ENCOUNTER
Referral request PT    PT to evaluate and treat 8 visits    Office notes from Dr. Madeleine Greene M.D.  [facility]:   He has degenerative disc disease of the cervical spine with some radicular symptoms with foraminal stenosis on the right side particul

## 2021-08-13 ENCOUNTER — ORDER TRANSCRIPTION (OUTPATIENT)
Dept: PHYSICAL THERAPY | Age: 46
End: 2021-08-13

## 2021-08-13 DIAGNOSIS — S43.431D LABRAL TEAR OF SHOULDER, RIGHT, SUBSEQUENT ENCOUNTER: ICD-10-CM

## 2021-08-13 DIAGNOSIS — M50.30 DEGENERATION OF CERVICAL INTERVERTEBRAL DISC: Primary | ICD-10-CM

## 2021-08-31 NOTE — PROGRESS NOTES
Chief Complaint:  Patient presents with:  Pain: right hip  Depression: zoloft refill    HPI:  This is a 39year old male patient presenting for Pain (right hip) and Depression (zoloft refill)    MDD, CONSTANTINO: On sertraline 50mg.  Notes this is working well for Years: 15.00        Pack years: 7.5        Types: Cigarettes        Quit date: 2001        Years since quittin.6      Smokeless tobacco: Never Used    Vaping Use      Vaping Use: Never used    Alcohol use:  Yes      Alcohol/week: 4.0 standard drink inspection   LUNGS: clear to auscultation bilaterally, no wheezes, rales or rhonchi, good air movement  CV: HRRR, no murmurs, no peripheral edema   EXTREMITIES: warm and well perfused  No pain with passive ROM, tenderness at R pubic ramus, hip flexor exten AM  [facility]

## 2021-09-01 ENCOUNTER — TELEPHONE (OUTPATIENT)
Dept: PHYSICAL THERAPY | Facility: HOSPITAL | Age: 46
End: 2021-09-01

## 2021-09-01 ENCOUNTER — TELEPHONE (OUTPATIENT)
Dept: FAMILY MEDICINE CLINIC | Facility: CLINIC | Age: 46
End: 2021-09-01

## 2021-09-01 ENCOUNTER — OFFICE VISIT (OUTPATIENT)
Dept: PHYSICAL THERAPY | Age: 46
End: 2021-09-01
Attending: ORTHOPAEDIC SURGERY
Payer: COMMERCIAL

## 2021-09-01 DIAGNOSIS — S43.431D LABRAL TEAR OF SHOULDER, RIGHT, SUBSEQUENT ENCOUNTER: ICD-10-CM

## 2021-09-01 DIAGNOSIS — M50.30 DEGENERATION OF CERVICAL INTERVERTEBRAL DISC: ICD-10-CM

## 2021-09-01 DIAGNOSIS — S43.431D SUPERIOR GLENOID LABRUM LESION OF RIGHT SHOULDER, SUBSEQUENT ENCOUNTER: ICD-10-CM

## 2021-09-01 PROCEDURE — 97162 PT EVAL MOD COMPLEX 30 MIN: CPT

## 2021-09-01 PROCEDURE — 97530 THERAPEUTIC ACTIVITIES: CPT

## 2021-09-01 NOTE — PROGRESS NOTES
SPINE EVALUATION:   Referring Physician: Dr. Jonna Torres  Diagnosis: Degeneration of cervical intervertebral disc (M50.30)  Labral tear of shoulder, right, subsequent encounter (A75.253E)   PT Dx: cervical spine radiculopathy on the R  Date of Service: 9/1/ temporary. Pt describes pain denies 24 hour pattern, duration of pain is the rest of the day. Pt reports current condition is same over time.    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    ASSESSMENT  Keagan presents to physical therapy evaluation with primary c/o RUE and R side neck pain.  The results of the objective tests and measures show negat tenderness to extensor or flexor mass bilaterally, negative     Strength: (* denotes performed with pain)  UE/Scapular   Shoulder Flex: R 5/5, L 5/5  Shoulder ABD (C5): R 5/5, L 5/5  Biceps (C6): R 5/5, L 5/5  Wrist ext (C6): R 5/5, L 5/5  Triceps (C7): R clinical rationale, this evaluation involved Moderate Complexity decision making due to 1-2 personal factors/comorbidities, 3 body structures involved/activity limitations, and evolving symptoms including changing pain levels.     PLAN OF CARE:    Goals: (t PT    [de-identified] certification required: Yes  I certify the need for these services furnished under this plan of treatment and while under my care.     X___________________________________________________ Date____________________    Certification From: 9/1

## 2021-09-07 ENCOUNTER — OFFICE VISIT (OUTPATIENT)
Dept: PHYSICAL THERAPY | Age: 46
End: 2021-09-07
Attending: ORTHOPAEDIC SURGERY
Payer: COMMERCIAL

## 2021-09-07 DIAGNOSIS — S43.431D LABRAL TEAR OF SHOULDER, RIGHT, SUBSEQUENT ENCOUNTER: ICD-10-CM

## 2021-09-07 DIAGNOSIS — M50.30 DEGENERATION OF CERVICAL INTERVERTEBRAL DISC: ICD-10-CM

## 2021-09-07 PROCEDURE — 97140 MANUAL THERAPY 1/> REGIONS: CPT

## 2021-09-07 PROCEDURE — 97110 THERAPEUTIC EXERCISES: CPT

## 2021-09-07 NOTE — PROGRESS NOTES
Dx: Degeneration of cervical intervertebral disc (M50.30)  Labral tear of shoulder, right, subsequent encounter (E97.327B)   PT Dx: cervical spine radiculopathy on the [facility] (Authorized # of Visits):  [place] Physician independent and compliant with comprehensive HEP to maintain progress achieved in PT     Plan: Continue per plan of care  Date: 9/7/2021  TX#: 2/8 Date:                 TX#: 3/ Date:                 TX#: 4/ Date:                 TX#: 5/ Date:    Tx#: 6/   M

## 2021-09-09 ENCOUNTER — APPOINTMENT (OUTPATIENT)
Dept: PHYSICAL THERAPY | Age: 46
End: 2021-09-09
Attending: ORTHOPAEDIC SURGERY
Payer: COMMERCIAL

## 2021-09-09 ENCOUNTER — PATIENT MESSAGE (OUTPATIENT)
Dept: FAMILY MEDICINE CLINIC | Facility: CLINIC | Age: 46
End: 2021-09-09

## 2021-09-09 DIAGNOSIS — M99.05 SOMATIC DYSFUNCTION OF PELVIC REGION: Primary | ICD-10-CM

## 2021-09-09 DIAGNOSIS — R10.31 RIGHT INGUINAL PAIN: ICD-10-CM

## 2021-09-09 NOTE — TELEPHONE ENCOUNTER
From: Starla Butterfield  To: Manohar Bennett DO  Sent: 9/9/2021 9:59 AM CDT  Subject: Visit Follow-up Question    Good morning:    As I mentioned during our visit I was planning to start PT for my neck, shoulder and elbow. That seems to be going well.  But als

## 2021-09-14 ENCOUNTER — OFFICE VISIT (OUTPATIENT)
Dept: PHYSICAL THERAPY | Age: 46
End: 2021-09-14
Attending: ORTHOPAEDIC SURGERY
Payer: COMMERCIAL

## 2021-09-14 DIAGNOSIS — M50.30 DEGENERATION OF CERVICAL INTERVERTEBRAL DISC: ICD-10-CM

## 2021-09-14 DIAGNOSIS — S43.431D LABRAL TEAR OF SHOULDER, RIGHT, SUBSEQUENT ENCOUNTER: ICD-10-CM

## 2021-09-14 PROCEDURE — 97140 MANUAL THERAPY 1/> REGIONS: CPT

## 2021-09-14 PROCEDURE — 97110 THERAPEUTIC EXERCISES: CPT

## 2021-09-14 NOTE — PROGRESS NOTES
Dx: Degeneration of cervical intervertebral disc (M50.30)  Labral tear of shoulder, right, subsequent encounter (J73.261E)   PT Dx: cervical spine radiculopathy on the [facility] (Authorized # of Visits):  [address] Physician maintain progress achieved in PT     Plan: Continue per plan of care  Date: 9/7/2021  TX#: 2/8 Date: 9/14/2021            TX#: 3/8 Date:                 TX#: 4/ Date:                 TX#: 5/ Date:    Tx#: 6/   Manual:   Traction gr III-IV 10 sec hold 10 rep

## 2021-09-16 ENCOUNTER — OFFICE VISIT (OUTPATIENT)
Dept: PHYSICAL THERAPY | Age: 46
End: 2021-09-16
Attending: ORTHOPAEDIC SURGERY
Payer: COMMERCIAL

## 2021-09-16 DIAGNOSIS — M50.30 DEGENERATION OF CERVICAL INTERVERTEBRAL DISC: ICD-10-CM

## 2021-09-16 DIAGNOSIS — S43.431D LABRAL TEAR OF SHOULDER, RIGHT, SUBSEQUENT ENCOUNTER: ICD-10-CM

## 2021-09-16 PROCEDURE — 97110 THERAPEUTIC EXERCISES: CPT

## 2021-09-16 PROCEDURE — 97140 MANUAL THERAPY 1/> REGIONS: CPT

## 2021-09-16 NOTE — PROGRESS NOTES
Dx: Degeneration of cervical intervertebral disc (M50.30)  Labral tear of shoulder, right, subsequent encounter (V10.643B)   PT Dx: cervical spine radiculopathy on the [facility] (Authorized # of Visits):  [place] Physician intrinsic strength to 4/5 to allow improved cervical stabilization with overhead reaching  · Pt will improve postural strength (mid/low trap) to 4+/5 to promote improved upright posturing and decreased pain with work activities   · Pt will be independent a centralization  Pt education: HEP update, benefits, plan of care  Pt education: HEP update            HEP: HEP for: ergonomic adjustments of desk, avoiding chest stretch indep as demonstrated to me, working on postural adjustments, ice after hockey or aggr

## 2021-09-21 ENCOUNTER — APPOINTMENT (OUTPATIENT)
Dept: PHYSICAL THERAPY | Age: 46
End: 2021-09-21
Attending: ORTHOPAEDIC SURGERY
Payer: COMMERCIAL

## 2021-09-21 ENCOUNTER — TELEPHONE (OUTPATIENT)
Dept: PHYSICAL THERAPY | Age: 46
End: 2021-09-21

## 2021-09-23 ENCOUNTER — OFFICE VISIT (OUTPATIENT)
Dept: PHYSICAL THERAPY | Age: 46
End: 2021-09-23
Attending: ORTHOPAEDIC SURGERY
Payer: COMMERCIAL

## 2021-09-23 DIAGNOSIS — S43.431D LABRAL TEAR OF SHOULDER, RIGHT, SUBSEQUENT ENCOUNTER: ICD-10-CM

## 2021-09-23 DIAGNOSIS — M50.30 DEGENERATION OF CERVICAL INTERVERTEBRAL DISC: ICD-10-CM

## 2021-09-23 PROCEDURE — 97140 MANUAL THERAPY 1/> REGIONS: CPT

## 2021-09-23 PROCEDURE — 97110 THERAPEUTIC EXERCISES: CPT

## 2021-09-23 NOTE — PROGRESS NOTES
Dx: Degeneration of cervical intervertebral disc (M50.30)  Labral tear of shoulder, right, subsequent encounter (M25.281X)   PT Dx: cervical spine radiculopathy on the [facility] (Authorized # of Visits):  [address] Physician achieved in PT     Plan: Continue per plan of care  Date: 9/7/2021  TX#: 2/8 Date: 9/14/2021            TX#: 3/8 Date:  9/16/2021                TX#: 4/8 Date:    9/23/2021              TX#: 5/8 Date:    Tx#: 6/   Manual:   Traction gr III-IV 10 sec hold 10 10 reps x 2 sets  Standing red band bilateral ER 10 reps; green band 10 reps   Red band ER 10 reps on the R towel abduction x 2 sets   Green band IR 10 reps x 2 sets   Wall push ups 10 reps x 2 sets   Serratus wall slides 10 reps   Yellow band wall walks 1

## 2021-09-23 NOTE — TELEPHONE ENCOUNTER
Pt was referred to Dr Madeleine Greene from us and then Dr Madeleine Greene referred him to [facility]. Pt has an HMO and should be going to THE [facility] instead. He did have an appt but will cancel that and call Central scheduling today.  It was for Occupational Therapy fo show

## 2021-09-28 ENCOUNTER — OFFICE VISIT (OUTPATIENT)
Dept: PHYSICAL THERAPY | Age: 46
End: 2021-09-28
Attending: ORTHOPAEDIC SURGERY
Payer: COMMERCIAL

## 2021-09-28 DIAGNOSIS — S43.431D LABRAL TEAR OF SHOULDER, RIGHT, SUBSEQUENT ENCOUNTER: ICD-10-CM

## 2021-09-28 DIAGNOSIS — M50.30 DEGENERATION OF CERVICAL INTERVERTEBRAL DISC: ICD-10-CM

## 2021-09-28 PROCEDURE — 97140 MANUAL THERAPY 1/> REGIONS: CPT

## 2021-09-28 PROCEDURE — 97110 THERAPEUTIC EXERCISES: CPT

## 2021-09-28 NOTE — PROGRESS NOTES
Dx: Degeneration of cervical intervertebral disc (M50.30)  Labral tear of shoulder, right, subsequent encounter (Z62.302K)   PT Dx: cervical spine radiculopathy on the [facility] (Authorized # of Visits):  [place] Physician 9/23/2021              TX#: 5/8 Date: 9/28/2021   Tx#: 6/8   Manual:   Traction gr III-IV 10 sec hold 10 reps x 3 sets   L PPIVM gr III-II 10 reps x 3 sets each level C2-C7 on the R  STM to R UT 5 min   Suboccipital release 2 min    Manual:   Traction gr I 10 reps   Seated flexion 10 reps cerivcal spine   Prone scapular retraction 10 reps x 2 sets  Standing red band bilateral ER 10 reps; green band 10 reps   Red band ER 10 reps on the R towel abduction x 2 sets   Green band IR 10 reps x 2 sets   Wall push up

## 2021-10-20 ENCOUNTER — APPOINTMENT (OUTPATIENT)
Dept: PHYSICAL THERAPY | Age: 46
End: 2021-10-20
Attending: FAMILY MEDICINE
Payer: COMMERCIAL

## 2021-11-08 ENCOUNTER — TELEPHONE (OUTPATIENT)
Dept: FAMILY MEDICINE CLINIC | Facility: CLINIC | Age: 46
End: 2021-11-08

## 2021-11-08 NOTE — TELEPHONE ENCOUNTER
TC from patient still waiting to get chiropractor approved. I explained to patient we have no notes from provider to get referral to chiropractor only for PT. Patient finished PT and now wants to go back to chiropractor.    Patient will make appt with

## 2021-11-17 ENCOUNTER — TELEMEDICINE (OUTPATIENT)
Dept: FAMILY MEDICINE CLINIC | Facility: CLINIC | Age: 46
End: 2021-11-17

## 2021-11-17 DIAGNOSIS — M54.50 CHRONIC BILATERAL LOW BACK PAIN WITHOUT SCIATICA: ICD-10-CM

## 2021-11-17 DIAGNOSIS — G89.29 CHRONIC BILATERAL LOW BACK PAIN WITHOUT SCIATICA: ICD-10-CM

## 2021-11-17 DIAGNOSIS — M54.12 CERVICAL RADICULOPATHY: ICD-10-CM

## 2021-11-17 DIAGNOSIS — M99.05 SOMATIC DYSFUNCTION OF PELVIC REGION: ICD-10-CM

## 2021-11-17 DIAGNOSIS — J03.90 TONSILLITIS: Primary | ICD-10-CM

## 2021-11-17 PROCEDURE — 99214 OFFICE O/P EST MOD 30 MIN: CPT | Performed by: FAMILY MEDICINE

## 2021-11-17 RX ORDER — AMOXICILLIN AND CLAVULANATE POTASSIUM 875; 125 MG/1; MG/1
1 TABLET, FILM COATED ORAL 2 TIMES DAILY
Qty: 20 TABLET | Refills: 0 | Status: SHIPPED | OUTPATIENT
Start: 2021-11-17 | End: 2021-11-27

## 2021-11-17 NOTE — PROGRESS NOTES
VIDEO VISIT  This visit is conducted using Telemedicine with live, interactive video and audio. Patient has been referred to the [facility] website at [email or web address] to review the yearly Consent to Treat document.     Patient understands and accepts Review of systems performed and negative unless stated in HPI. Past medical, family and social history reviewed and listed as below.     HISTORY:  Past Medical History:   Diagnosis Date   • Acute tonsillitis    • Concussion    • Dizziness    • Foot pain (750-1,500 mg total) by mouth 3 (three) times daily. (Patient not taking: Reported on 8/31/2021 ) 20 tablet 0   • Aluminum Chloride 20 % External Solution Apply 1 Application topically nightly.  1 Bottle 0   • GLUCOSAMINE SULFATE OR Take 2 capsules by mouth days.    Somatic dysfunction of pelvic region  Cervical radiculopathy  Chronic bilateral low back pain without sciatica  Previously noted somatic dysfunction on exam.  Patient is able to await chronic medication use with routine chiropractic care.   Recomme

## 2021-11-17 NOTE — TELEPHONE ENCOUNTER
Please see documentation from today's (11/17) visit regarding recommendation to return to chiropractor. I put in the referral to chiropractor again. We will to follow-up on the status of this?     Niyah Gallagher, DO

## 2021-12-20 ENCOUNTER — TELEPHONE (OUTPATIENT)
Dept: FAMILY MEDICINE CLINIC | Facility: CLINIC | Age: 46
End: 2021-12-20

## 2021-12-20 ENCOUNTER — OFFICE VISIT (OUTPATIENT)
Dept: FAMILY MEDICINE CLINIC | Facility: CLINIC | Age: 46
End: 2021-12-20

## 2021-12-20 VITALS
OXYGEN SATURATION: 99 % | SYSTOLIC BLOOD PRESSURE: 110 MMHG | WEIGHT: 220 LBS | TEMPERATURE: 98 F | RESPIRATION RATE: 16 BRPM | BODY MASS INDEX: 29.8 KG/M2 | HEIGHT: 72 IN | DIASTOLIC BLOOD PRESSURE: 78 MMHG | HEART RATE: 83 BPM

## 2021-12-20 DIAGNOSIS — J11.1 INFLUENZA-LIKE ILLNESS: Primary | ICD-10-CM

## 2021-12-20 PROCEDURE — 99213 OFFICE O/P EST LOW 20 MIN: CPT | Performed by: NURSE PRACTITIONER

## 2021-12-20 PROCEDURE — 3078F DIAST BP <80 MM HG: CPT | Performed by: NURSE PRACTITIONER

## 2021-12-20 PROCEDURE — 3008F BODY MASS INDEX DOCD: CPT | Performed by: NURSE PRACTITIONER

## 2021-12-20 PROCEDURE — 3074F SYST BP LT 130 MM HG: CPT | Performed by: NURSE PRACTITIONER

## 2021-12-20 NOTE — TELEPHONE ENCOUNTER
Lauro Tejada is feeling horrible he has a  low grade fever .  Fatigue headache sinus coughing he did take two COVID test both were negative please call and let patient know how to proceed

## 2021-12-20 NOTE — TELEPHONE ENCOUNTER
Patient states he started feeling sick on Tuesday. Started with a headache, stuffy nose, was feeling tired and slept a lot. Was also feeling sweaty. Temp . Still has cough. Improved some but still feels it in his chest.   Denies SOB or wheezing.  Do

## 2021-12-20 NOTE — PROGRESS NOTES
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  Patient reports congestion, dry cough, sinus pain, fevers, rash    • Sore throat    • URI (upper respiratory infection)    • Viral labyrinthitis       Past Surgical History:   Procedure Laterality Date   • OTHER SURGICAL HISTORY  1996    urethra surgery, removal of cyst   • VASECTOMY Bilateral 10/15/2015    Dr. Chayito Jung with    ASSESSMENT:   Influenza-like illness  (primary encounter diagnosis)    PLAN: Meds as below.   Comfort care as described in Patient Instructions  Educated on supportive measures  Educated on s/s of worsening sx and when to seek higher level of care others. · You may use over-the-counter acetaminophen or ibuprofen for fever, muscle aching, and headache, unless another medicine was prescribed for this.  If you have chronic liver or kidney disease or ever had a stomach ulcer or gastrointestinal bleeding 5 times a day); blood (red or black color) or mucus in diarrhea  · Feeling weak, dizzy, or like you are going to faint  · Extreme thirst  · Fever of 100.4°F (38°C) or higher, or as directed by your healthcare provider  María last reviewed this education

## 2021-12-30 ENCOUNTER — IMMUNIZATION (OUTPATIENT)
Dept: LAB | Facility: HOSPITAL | Age: 46
End: 2021-12-30
Attending: EMERGENCY MEDICINE
Payer: COMMERCIAL

## 2021-12-30 DIAGNOSIS — Z23 NEED FOR VACCINATION: Primary | ICD-10-CM

## 2021-12-30 PROCEDURE — 0004A SARSCOV2 VAC 30MCG/0.3ML IM: CPT

## 2022-01-21 ENCOUNTER — TELEMEDICINE (OUTPATIENT)
Dept: FAMILY MEDICINE CLINIC | Facility: CLINIC | Age: 47
End: 2022-01-21

## 2022-01-21 ENCOUNTER — LAB ENCOUNTER (OUTPATIENT)
Dept: LAB | Facility: HOSPITAL | Age: 47
End: 2022-01-21
Attending: FAMILY MEDICINE
Payer: COMMERCIAL

## 2022-01-21 DIAGNOSIS — J02.9 SORE THROAT: Primary | ICD-10-CM

## 2022-01-21 DIAGNOSIS — Z20.822 CONTACT WITH AND (SUSPECTED) EXPOSURE TO COVID-19: ICD-10-CM

## 2022-01-21 DIAGNOSIS — J02.9 SORE THROAT: ICD-10-CM

## 2022-01-21 PROCEDURE — 99213 OFFICE O/P EST LOW 20 MIN: CPT | Performed by: FAMILY MEDICINE

## 2022-01-21 RX ORDER — AMOXICILLIN 500 MG/1
500 CAPSULE ORAL 3 TIMES DAILY
Qty: 30 CAPSULE | Refills: 0 | Status: SHIPPED | OUTPATIENT
Start: 2022-01-21 | End: 2022-01-31

## 2022-01-21 NOTE — PROGRESS NOTES
VIDEO VISIT  This visit is conducted using Telemedicine with live, interactive video and audio. Patient has been referred to the [facility] website at [email or web address] to review the yearly Consent to Treat document.     Patient understands and accepts Smoker        Packs/day: 0.50        Years: 15.00        Pack years: 7.5        Types: Cigarettes        Quit date: 2001        Years since quittin.0      Smokeless tobacco: Never Used    Vaping Use      Vaping Use: Never used    Alcohol use:  Yes full sentences comfortably and Normal work of breathing    ASSESSMENT AND PLAN:  Discussed the following assessment   Problem List Items Addressed This Visit     None      Visit Diagnoses     Sore throat    -  Primary    Relevant Medications    amoxicillin

## 2022-01-24 LAB — SARS-COV-2 RNA RESP QL NAA+PROBE: NOT DETECTED

## 2022-03-31 ENCOUNTER — TELEPHONE (OUTPATIENT)
Dept: FAMILY MEDICINE CLINIC | Facility: CLINIC | Age: 47
End: 2022-03-31

## 2022-03-31 ENCOUNTER — OFFICE VISIT (OUTPATIENT)
Dept: FAMILY MEDICINE CLINIC | Facility: CLINIC | Age: 47
End: 2022-03-31
Payer: COMMERCIAL

## 2022-03-31 VITALS
DIASTOLIC BLOOD PRESSURE: 70 MMHG | WEIGHT: 229.38 LBS | RESPIRATION RATE: 18 BRPM | HEART RATE: 70 BPM | HEIGHT: 71.5 IN | SYSTOLIC BLOOD PRESSURE: 112 MMHG | BODY MASS INDEX: 31.41 KG/M2

## 2022-03-31 DIAGNOSIS — L82.0 SEBORRHEIC KERATOSIS, INFLAMED: Primary | ICD-10-CM

## 2022-03-31 DIAGNOSIS — M54.50 RECURRENT LOW BACK PAIN: ICD-10-CM

## 2022-03-31 PROCEDURE — 3078F DIAST BP <80 MM HG: CPT | Performed by: FAMILY MEDICINE

## 2022-03-31 PROCEDURE — 99213 OFFICE O/P EST LOW 20 MIN: CPT | Performed by: FAMILY MEDICINE

## 2022-03-31 PROCEDURE — 3074F SYST BP LT 130 MM HG: CPT | Performed by: FAMILY MEDICINE

## 2022-03-31 PROCEDURE — 3008F BODY MASS INDEX DOCD: CPT | Performed by: FAMILY MEDICINE

## 2022-03-31 NOTE — TELEPHONE ENCOUNTER
1. Laboratory examination ordered as part of a routine general medical examination (Primary)  -     Comp Metabolic Panel (14); Future; Expected date: 03/31/2022  -     Lipid Panel; Future; Expected date: 03/31/2022  -     TSH W Reflex To Free T4; Future; Expected date: 03/31/2022  -     CBC, Platelet; No Differential; Future; Expected date: 03/31/2022       OK to notify.  Thanks, Pankaj Townsend MD

## 2022-03-31 NOTE — TELEPHONE ENCOUNTER
Please enter lab orders for the patient's upcoming physical appointment. Physical scheduled: Your appointments     Date & Time Appointment Department [facility])    Apr 13, 2022  3:00 PM CDT Physical - [facility] [address], [place]  ([facility])            [address] [place] [address] [address] [facility] [phone]         Preferred lab: [facility])     The patient has been notified to complete fasting labs prior to their physical appointment.

## 2022-04-13 ENCOUNTER — OFFICE VISIT (OUTPATIENT)
Dept: FAMILY MEDICINE CLINIC | Facility: CLINIC | Age: 47
End: 2022-04-13
Payer: COMMERCIAL

## 2022-04-13 VITALS
HEIGHT: 71 IN | HEART RATE: 72 BPM | SYSTOLIC BLOOD PRESSURE: 118 MMHG | RESPIRATION RATE: 18 BRPM | BODY MASS INDEX: 32.01 KG/M2 | DIASTOLIC BLOOD PRESSURE: 70 MMHG | WEIGHT: 228.63 LBS

## 2022-04-13 DIAGNOSIS — D22.9 CHANGE IN MOLE: Primary | ICD-10-CM

## 2022-04-13 PROCEDURE — 88305 TISSUE EXAM BY PATHOLOGIST: CPT | Performed by: FAMILY MEDICINE

## 2022-04-13 PROCEDURE — 3078F DIAST BP <80 MM HG: CPT | Performed by: FAMILY MEDICINE

## 2022-04-13 PROCEDURE — 11301 SHAVE SKIN LESION 0.6-1.0 CM: CPT | Performed by: FAMILY MEDICINE

## 2022-04-13 PROCEDURE — 17110 DESTRUCTION B9 LES UP TO 14: CPT | Performed by: FAMILY MEDICINE

## 2022-04-13 PROCEDURE — 3074F SYST BP LT 130 MM HG: CPT | Performed by: FAMILY MEDICINE

## 2022-04-13 PROCEDURE — 3008F BODY MASS INDEX DOCD: CPT | Performed by: FAMILY MEDICINE

## 2022-05-08 ENCOUNTER — OFFICE VISIT (OUTPATIENT)
Dept: FAMILY MEDICINE CLINIC | Facility: CLINIC | Age: 47
End: 2022-05-08
Payer: COMMERCIAL

## 2022-05-08 VITALS
SYSTOLIC BLOOD PRESSURE: 130 MMHG | HEART RATE: 82 BPM | WEIGHT: 220 LBS | DIASTOLIC BLOOD PRESSURE: 70 MMHG | HEIGHT: 72 IN | RESPIRATION RATE: 16 BRPM | TEMPERATURE: 97 F | OXYGEN SATURATION: 100 % | BODY MASS INDEX: 29.8 KG/M2

## 2022-05-08 DIAGNOSIS — J02.9 SORE THROAT: Primary | ICD-10-CM

## 2022-05-08 LAB
CONTROL LINE PRESENT WITH A CLEAR BACKGROUND (YES/NO): YES YES/NO
KIT LOT #: NORMAL NUMERIC

## 2022-05-08 PROCEDURE — 3075F SYST BP GE 130 - 139MM HG: CPT | Performed by: NURSE PRACTITIONER

## 2022-05-08 PROCEDURE — 99213 OFFICE O/P EST LOW 20 MIN: CPT | Performed by: NURSE PRACTITIONER

## 2022-05-08 PROCEDURE — 3008F BODY MASS INDEX DOCD: CPT | Performed by: NURSE PRACTITIONER

## 2022-05-08 PROCEDURE — 87880 STREP A ASSAY W/OPTIC: CPT | Performed by: NURSE PRACTITIONER

## 2022-05-08 PROCEDURE — 3078F DIAST BP <80 MM HG: CPT | Performed by: NURSE PRACTITIONER

## 2022-05-09 LAB — SARS-COV-2 RNA RESP QL NAA+PROBE: NOT DETECTED

## 2022-05-10 ENCOUNTER — TELEMEDICINE (OUTPATIENT)
Dept: FAMILY MEDICINE CLINIC | Facility: CLINIC | Age: 47
End: 2022-05-10

## 2022-05-10 DIAGNOSIS — R05.9 COUGH: Primary | ICD-10-CM

## 2022-05-10 DIAGNOSIS — J02.9 SORE THROAT: ICD-10-CM

## 2022-05-10 DIAGNOSIS — J30.2 SEASONAL ALLERGIC REACTION: ICD-10-CM

## 2022-05-10 PROCEDURE — 99213 OFFICE O/P EST LOW 20 MIN: CPT | Performed by: FAMILY MEDICINE

## 2022-05-10 RX ORDER — PREDNISONE 20 MG/1
40 TABLET ORAL DAILY
Qty: 10 TABLET | Refills: 0 | Status: SHIPPED | OUTPATIENT
Start: 2022-05-10 | End: 2022-05-15

## 2022-05-11 ENCOUNTER — PATIENT MESSAGE (OUTPATIENT)
Dept: FAMILY MEDICINE CLINIC | Facility: CLINIC | Age: 47
End: 2022-05-11

## 2022-05-11 RX ORDER — AMOXICILLIN AND CLAVULANATE POTASSIUM 875; 125 MG/1; MG/1
1 TABLET, FILM COATED ORAL 2 TIMES DAILY
Qty: 20 TABLET | Refills: 0 | Status: SHIPPED | OUTPATIENT
Start: 2022-05-11 | End: 2022-05-21

## 2022-05-11 NOTE — TELEPHONE ENCOUNTER
From: Abbie Angulo  To: Lucas Richmond,   Sent: 5/11/2022 8:02 AM CDT  Subject: Allergies/virus    Good morning Dr. Jackson Morris: I am starting to see color when blowing my nose. As you mentioned, I would like to avoid this turning into a sinus infection. Is there anything else you think I should be prescribed?      Thank you,  Henrry Shankar

## 2022-08-24 ENCOUNTER — PATIENT MESSAGE (OUTPATIENT)
Dept: FAMILY MEDICINE CLINIC | Facility: CLINIC | Age: 47
End: 2022-08-24

## 2022-08-24 NOTE — TELEPHONE ENCOUNTER
Pt has been notified to schedule his physical in order for us to place order for his PSA. He will have his labs drawn first from March and then call back to schedule.

## 2022-08-24 NOTE — TELEPHONE ENCOUNTER
Pt had scheduled physical for April with Dr. Del Rio Levels, never completed this. Needs to get scheduled for a physical exam and PSA can be added if needed.  These are only done as part of physical exam.

## 2022-09-15 DIAGNOSIS — F32.1 MODERATE SINGLE CURRENT EPISODE OF MAJOR DEPRESSIVE DISORDER (HCC): ICD-10-CM

## 2022-09-15 DIAGNOSIS — F41.1 GAD (GENERALIZED ANXIETY DISORDER): ICD-10-CM

## 2022-09-19 ENCOUNTER — TELEPHONE (OUTPATIENT)
Dept: FAMILY MEDICINE CLINIC | Facility: CLINIC | Age: 47
End: 2022-09-19

## 2022-09-19 DIAGNOSIS — Z00.00 ROUTINE HEALTH MAINTENANCE: Primary | ICD-10-CM

## 2022-09-19 NOTE — TELEPHONE ENCOUNTER
Please enter lab orders for the patient's upcoming physical appointment. Physical scheduled: Your appointments     Date & Time Appointment Department [facility])    Oct 28, 2022 10:20 AM CDT Physical - Established with Heather Echevarria DO [facility], [address], [place]  ([facility])            [place] [place], [address], [place] [address] [place] [address] [phone]         Preferred lab: [address] [facility] [address] [facility])     The patient has been notified to complete fasting labs prior to their physical appointment.

## 2022-10-27 ENCOUNTER — LAB ENCOUNTER (OUTPATIENT)
Dept: LAB | Age: 47
End: 2022-10-27
Attending: FAMILY MEDICINE
Payer: COMMERCIAL

## 2022-10-27 DIAGNOSIS — Z00.00 ROUTINE HEALTH MAINTENANCE: ICD-10-CM

## 2022-10-27 LAB
ALBUMIN SERPL-MCNC: 4.2 G/DL (ref 3.4–5)
ALBUMIN/GLOB SERPL: 1.2 {RATIO} (ref 1–2)
ALP LIVER SERPL-CCNC: 53 U/L
ALT SERPL-CCNC: 28 U/L
ANION GAP SERPL CALC-SCNC: 7 MMOL/L (ref 0–18)
AST SERPL-CCNC: 12 U/L (ref 15–37)
BILIRUB SERPL-MCNC: 0.6 MG/DL (ref 0.1–2)
BUN BLD-MCNC: 18 MG/DL (ref 7–18)
CALCIUM BLD-MCNC: 9.4 MG/DL (ref 8.5–10.1)
CHLORIDE SERPL-SCNC: 106 MMOL/L (ref 98–112)
CHOLEST SERPL-MCNC: 231 MG/DL (ref ?–200)
CO2 SERPL-SCNC: 26 MMOL/L (ref 21–32)
CREAT BLD-MCNC: 0.97 MG/DL
FASTING PATIENT LIPID ANSWER: YES
FASTING STATUS PATIENT QL REPORTED: YES
GFR SERPLBLD BASED ON 1.73 SQ M-ARVRAT: 98 ML/MIN/1.73M2 (ref 60–?)
GLOBULIN PLAS-MCNC: 3.5 G/DL (ref 2.8–4.4)
GLUCOSE BLD-MCNC: 89 MG/DL (ref 70–99)
HDLC SERPL-MCNC: 45 MG/DL (ref 40–59)
LDLC SERPL CALC-MCNC: 157 MG/DL (ref ?–100)
NONHDLC SERPL-MCNC: 186 MG/DL (ref ?–130)
OSMOLALITY SERPL CALC.SUM OF ELEC: 289 MOSM/KG (ref 275–295)
POTASSIUM SERPL-SCNC: 4.2 MMOL/L (ref 3.5–5.1)
PROT SERPL-MCNC: 7.7 G/DL (ref 6.4–8.2)
SODIUM SERPL-SCNC: 139 MMOL/L (ref 136–145)
TRIGL SERPL-MCNC: 157 MG/DL (ref 30–149)
VLDLC SERPL CALC-MCNC: 30 MG/DL (ref 0–30)

## 2022-10-27 PROCEDURE — 36415 COLL VENOUS BLD VENIPUNCTURE: CPT

## 2022-10-27 PROCEDURE — 80053 COMPREHEN METABOLIC PANEL: CPT

## 2022-10-27 PROCEDURE — 80061 LIPID PANEL: CPT

## 2022-10-28 ENCOUNTER — OFFICE VISIT (OUTPATIENT)
Dept: FAMILY MEDICINE CLINIC | Facility: CLINIC | Age: 47
End: 2022-10-28
Payer: COMMERCIAL

## 2022-10-28 VITALS
HEART RATE: 84 BPM | WEIGHT: 229 LBS | TEMPERATURE: 97 F | RESPIRATION RATE: 16 BRPM | DIASTOLIC BLOOD PRESSURE: 78 MMHG | HEIGHT: 71 IN | BODY MASS INDEX: 32.06 KG/M2 | SYSTOLIC BLOOD PRESSURE: 120 MMHG | OXYGEN SATURATION: 99 %

## 2022-10-28 DIAGNOSIS — Z12.11 SCREEN FOR COLON CANCER: ICD-10-CM

## 2022-10-28 DIAGNOSIS — F32.1 MODERATE SINGLE CURRENT EPISODE OF MAJOR DEPRESSIVE DISORDER (HCC): ICD-10-CM

## 2022-10-28 DIAGNOSIS — Z00.00 ROUTINE HEALTH MAINTENANCE: Primary | ICD-10-CM

## 2022-10-28 DIAGNOSIS — Z12.5 SCREENING FOR MALIGNANT NEOPLASM OF PROSTATE: ICD-10-CM

## 2022-10-28 DIAGNOSIS — F41.1 GAD (GENERALIZED ANXIETY DISORDER): ICD-10-CM

## 2022-10-28 PROCEDURE — 3074F SYST BP LT 130 MM HG: CPT | Performed by: FAMILY MEDICINE

## 2022-10-28 PROCEDURE — 99396 PREV VISIT EST AGE 40-64: CPT | Performed by: FAMILY MEDICINE

## 2022-10-28 PROCEDURE — 3008F BODY MASS INDEX DOCD: CPT | Performed by: FAMILY MEDICINE

## 2022-10-28 PROCEDURE — 3078F DIAST BP <80 MM HG: CPT | Performed by: FAMILY MEDICINE

## 2022-10-28 RX ORDER — ALPRAZOLAM 0.5 MG/1
0.5 TABLET ORAL 2 TIMES DAILY PRN
Qty: 30 TABLET | Refills: 0 | Status: SHIPPED | OUTPATIENT
Start: 2022-10-28

## 2022-11-15 ENCOUNTER — TELEPHONE (OUTPATIENT)
Dept: FAMILY MEDICINE CLINIC | Facility: CLINIC | Age: 47
End: 2022-11-15

## 2022-11-15 DIAGNOSIS — Z23 NEEDS FLU SHOT: ICD-10-CM

## 2022-11-15 DIAGNOSIS — Z23 NEED FOR TDAP VACCINATION: Primary | ICD-10-CM

## 2022-11-15 NOTE — TELEPHONE ENCOUNTER
Patient has appt for TDAP and flu shot on 11/17/22. Patient last seen 10/28/22 by Dr. Valarie Yousif DO.  Order pending for your review

## 2022-11-17 ENCOUNTER — NURSE ONLY (OUTPATIENT)
Dept: FAMILY MEDICINE CLINIC | Facility: CLINIC | Age: 47
End: 2022-11-17
Payer: COMMERCIAL

## 2022-11-17 VITALS — TEMPERATURE: 99 F

## 2022-11-17 DIAGNOSIS — Z23 NEED FOR VACCINATION: Primary | ICD-10-CM

## 2022-11-17 PROCEDURE — 90715 TDAP VACCINE 7 YRS/> IM: CPT | Performed by: FAMILY MEDICINE

## 2022-11-17 PROCEDURE — 90471 IMMUNIZATION ADMIN: CPT | Performed by: FAMILY MEDICINE

## 2022-11-17 PROCEDURE — 90686 IIV4 VACC NO PRSV 0.5 ML IM: CPT | Performed by: FAMILY MEDICINE

## 2022-11-17 PROCEDURE — 90472 IMMUNIZATION ADMIN EACH ADD: CPT | Performed by: FAMILY MEDICINE

## 2022-11-17 NOTE — PROGRESS NOTES
Patient presents for flu shot and TDAP. VIS is given and consent signed. Patient would like them both in the left deltoid. No complaints.   Patient left without incident

## 2022-12-14 ENCOUNTER — OFFICE VISIT (OUTPATIENT)
Dept: FAMILY MEDICINE CLINIC | Facility: CLINIC | Age: 47
End: 2022-12-14
Payer: COMMERCIAL

## 2022-12-14 VITALS
HEIGHT: 71 IN | BODY MASS INDEX: 34.02 KG/M2 | HEART RATE: 71 BPM | SYSTOLIC BLOOD PRESSURE: 128 MMHG | WEIGHT: 243 LBS | TEMPERATURE: 97 F | DIASTOLIC BLOOD PRESSURE: 80 MMHG | RESPIRATION RATE: 16 BRPM

## 2022-12-14 DIAGNOSIS — J01.40 ACUTE NON-RECURRENT PANSINUSITIS: Primary | ICD-10-CM

## 2022-12-14 PROCEDURE — 3074F SYST BP LT 130 MM HG: CPT | Performed by: PHYSICIAN ASSISTANT

## 2022-12-14 PROCEDURE — 3008F BODY MASS INDEX DOCD: CPT | Performed by: PHYSICIAN ASSISTANT

## 2022-12-14 PROCEDURE — 99213 OFFICE O/P EST LOW 20 MIN: CPT | Performed by: PHYSICIAN ASSISTANT

## 2022-12-14 PROCEDURE — 3079F DIAST BP 80-89 MM HG: CPT | Performed by: PHYSICIAN ASSISTANT

## 2022-12-14 RX ORDER — AMOXICILLIN AND CLAVULANATE POTASSIUM 875; 125 MG/1; MG/1
1 TABLET, FILM COATED ORAL 2 TIMES DAILY
Qty: 14 TABLET | Refills: 0 | Status: SHIPPED | OUTPATIENT
Start: 2022-12-14 | End: 2022-12-21

## 2023-02-17 NOTE — TELEPHONE ENCOUNTER
Advance Care Planning   Healthcare Decision Maker:    Primary Decision Maker: Hunter Campbellr - Spouse - [phone]    Click here to complete Healthcare Decision Makers including selection of the Healthcare Decision Maker Relationship (ie \"Primary\"). LOV 2/21/17 and at that time, pt was treated with Augmentin for a sinus infection. Pt states that he felt some what better after several days on the antibiotic. He returned to work on 2/27/17. Now he is feeling worse.  He feels cloudy with a sinus headac

## 2023-03-15 DIAGNOSIS — F32.1 MODERATE SINGLE CURRENT EPISODE OF MAJOR DEPRESSIVE DISORDER (HCC): ICD-10-CM

## 2023-03-15 DIAGNOSIS — F41.1 GAD (GENERALIZED ANXIETY DISORDER): ICD-10-CM
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
## 2023-08-04 ENCOUNTER — OFFICE VISIT (OUTPATIENT)
Dept: FAMILY MEDICINE CLINIC | Facility: CLINIC | Age: 48
End: 2023-08-04
Payer: COMMERCIAL

## 2023-08-04 VITALS
SYSTOLIC BLOOD PRESSURE: 118 MMHG | RESPIRATION RATE: 16 BRPM | HEIGHT: 71 IN | WEIGHT: 235 LBS | TEMPERATURE: 98 F | OXYGEN SATURATION: 98 % | DIASTOLIC BLOOD PRESSURE: 74 MMHG | BODY MASS INDEX: 32.9 KG/M2 | HEART RATE: 64 BPM

## 2023-08-04 DIAGNOSIS — J02.9 SORE THROAT: ICD-10-CM

## 2023-08-04 DIAGNOSIS — J06.9 VIRAL UPPER RESPIRATORY TRACT INFECTION: Primary | ICD-10-CM

## 2023-08-04 PROCEDURE — 99213 OFFICE O/P EST LOW 20 MIN: CPT | Performed by: FAMILY MEDICINE

## 2023-08-04 PROCEDURE — 3008F BODY MASS INDEX DOCD: CPT | Performed by: FAMILY MEDICINE

## 2023-08-04 PROCEDURE — 3074F SYST BP LT 130 MM HG: CPT | Performed by: FAMILY MEDICINE

## 2023-08-04 PROCEDURE — 87880 STREP A ASSAY W/OPTIC: CPT | Performed by: FAMILY MEDICINE

## 2023-08-04 PROCEDURE — 3078F DIAST BP <80 MM HG: CPT | Performed by: FAMILY MEDICINE

## 2023-08-04 NOTE — PATIENT INSTRUCTIONS
Your rapid strep testing was negative in the office. Use OTC meds for comfort as needed--  Ibuprofen/Tylenol for fever/pain  Use Benadryl at bedtime to reduce drainage and promote rest.  Zyrtec/Claritin/Allegra in the AM to reduce nasal drainage without sedation. Use saline nasal sprays to reduce congestion and thin secretions. Use Delsym for cough. Consider applying kartik's vapo-rub or eucayptus oil to chest and feet at bedtime to reduce chest and nasal congestion. Warm tea with honey, cough lozenges, vaporizers/steam etc.    If no better in 2-3 days, follow-up with your PCP for further evaluation.

## 2023-09-25 NOTE — TELEPHONE ENCOUNTER
Referral request Mingo Melgar    Office notes from Mingo Rush 8/3/2020    Plan:   Today's Treatment:  Chief Complaint Location: posterior cervical neck  Primary treatment: [facility] CMT to the left [place]

## 2023-09-27 DIAGNOSIS — F41.1 GAD (GENERALIZED ANXIETY DISORDER): ICD-10-CM

## 2023-09-27 DIAGNOSIS — F32.1 MODERATE SINGLE CURRENT EPISODE OF MAJOR DEPRESSIVE DISORDER (HCC): ICD-10-CM

## 2023-11-13 PROBLEM — Z12.11 SPECIAL SCREENING FOR MALIGNANT NEOPLASM OF COLON: Status: ACTIVE | Noted: 2023-11-13

## 2023-12-29 NOTE — TELEPHONE ENCOUNTER
Goal Outcome Evaluation:      A/oX4 Pain to lower back with PRN's effective. PT/OT to evaluate for rehab placement. This am patient refused to sit upright in bed.                   Pt calling for pain specialist referral for excruciating shoulder pain.  Pt states he spoke to [facility] referral yesterday

## 2024-04-03 ENCOUNTER — TELEPHONE (OUTPATIENT)
Dept: ORTHOPEDICS CLINIC | Facility: CLINIC | Age: 49
End: 2024-04-03

## 2024-04-03 DIAGNOSIS — M25.511 RIGHT SHOULDER PAIN, UNSPECIFIED CHRONICITY: Primary | ICD-10-CM

## 2024-04-05 ENCOUNTER — HOSPITAL ENCOUNTER (OUTPATIENT)
Dept: GENERAL RADIOLOGY | Age: 49
Discharge: HOME | End: 2024-04-05
Attending: ORTHOPAEDIC SURGERY
Payer: COMMERCIAL

## 2024-04-05 ENCOUNTER — OFFICE VISIT (OUTPATIENT)
Dept: ORTHOPEDICS CLINIC | Facility: CLINIC | Age: 49
End: 2024-04-05
Payer: COMMERCIAL

## 2024-04-05 VITALS — WEIGHT: 235 LBS | HEIGHT: 71 IN | BODY MASS INDEX: 32.9 KG/M2

## 2024-04-05 DIAGNOSIS — S43.431A SUPERIOR GLENOID LABRUM LESION OF RIGHT SHOULDER, INITIAL ENCOUNTER: Primary | ICD-10-CM

## 2024-04-05 DIAGNOSIS — G44.019 EPISODIC CLUSTER HEADACHE, NOT INTRACTABLE: ICD-10-CM

## 2024-04-05 DIAGNOSIS — M54.12 CERVICAL RADICULOPATHY: ICD-10-CM

## 2024-04-05 DIAGNOSIS — M25.511 RIGHT SHOULDER PAIN, UNSPECIFIED CHRONICITY: ICD-10-CM

## 2024-04-05 PROCEDURE — 3008F BODY MASS INDEX DOCD: CPT | Performed by: ORTHOPAEDIC SURGERY

## 2024-04-05 PROCEDURE — 73030 X-RAY EXAM OF SHOULDER: CPT | Performed by: ORTHOPAEDIC SURGERY

## 2024-04-05 PROCEDURE — 99204 OFFICE O/P NEW MOD 45 MIN: CPT | Performed by: ORTHOPAEDIC SURGERY

## 2024-04-05 NOTE — H&P
Orthopaedic Surgery  [address] [place] [id number]  [phone]     NEW PATIENT VISIT - HISTORY AND PHYSICAL EXAMINATION     Name: Keagan Teran   MRN: [id number]  Date: 4/5/2024     CC: Right shoulder and neck pain    REFERRED BY: William Ortiz MD    HPI:   Keagan Teran is a very pleasant 48 year old right-hand dominant male who presents today for evaluation of right shoulder pain, he reports a previously diagnosed SLAP tear in 2014. Recently aggravated within the last several month while playing hockey. The consistent pain that has been bothering him. He is unable to workout, when he lifts weights his arm starts bothering him. He has not tried any conservative treatments or injections in the past. Pain is rated up to 5/10 with radiating nature from the neck to the shoulder and involving the medial border of the scapula.     Additional history of cervical spine degenerative changes that have been ongoing for many years. No prior history of injection / surgery.     Additionally, the patient reports recent history of cluster headaches that are untreated and worsening.     He enjoys staying active and playing hockey and baseball. He works a .    PMH:   Past Medical History:   Diagnosis Date    Acute tonsillitis     Anxiety     Arthritis     Back pain     Concussion     Dizziness     Flatulence/gas pain/belching     Foot pain     soft tissue    Headache disorder     Labral tear of shoulder, right, subsequent encounter 03/06/2017    Lipid screening 05/12/2012    Lower back pain     Otitis media of left ear     Pain in joints     Personal history of tobacco use, presenting hazards to health 06/29/2012    Skin rash     Sore throat     Stress     URI (upper respiratory infection)     Viral labyrinthitis      PAST SURGICAL HX:  Past Surgical History:   Procedure Laterality Date    OTHER SURGICAL HISTORY  1996    urethra surgery, removal of cyst    VASECTOMY Bilateral 10/15/2015      [place]     FAMILY HX:  Family History   Problem Relation Age of Onset    Heart Attack Father     Diabetes Father     Colon Polyps Father     Heart Disease Father     Other (Other) Father     Uterine Cancer Mother     Other (CHF) Mother     Prostate Cancer Paternal Grandmother     Colon Polyps Sister     Prostate Cancer Paternal Grandfather     Colon Polyps Sister      ALLERGIES:  Bees    MEDICATIONS:   Current Outpatient Medications   Medication Sig Dispense Refill    sertraline 50 MG Oral Tab Take 1 tablet (50 mg total) by mouth daily. 90 tablet 0    Sodium Sulfate-Mag Sulfate-KCl (SUTAB) 3107-597-767 MG Oral Tab Take as directed by physician. 24 tablet 0    GLUCOSAMINE SULFATE OR Take 2 capsules by mouth daily.      Multiple Vitamins-Minerals (MULTIVITAMIN OR) Take by mouth daily.        Omega-3 Fatty Acids (FISH OIL) 875 MG Oral Cap Take by mouth daily.        Cholecalciferol (VITAMIN D OR) Take 1,000 Units by mouth daily.        Thiamine HCl (VITAMIN B-1 OR) Take by mouth daily.         ROS: A comprehensive 14 point review of systems was performed and was negative aside from the aforementioned per history of present illness.    SOCIAL HX:  Social History     Tobacco Use    Smoking status: Former     Packs/day: 1.00     Years: 15.00     Additional pack years: 0.00     Total pack years: 15.00     Types: Cigarettes     Quit date: 2001     Years since quittin.2    Smokeless tobacco: Never   Substance Use Topics    Alcohol use: Yes     Alcohol/week: 4.0 standard drinks of alcohol     Types: 2 Cans of beer, 2 Standard drinks or equivalent per week     Comment: several tiems per week     PE:   There were no vitals filed for this visit.  Estimated body mass index is 31.19 kg/m² as calculated from the following:    Height as of 23: 6' (1.829 m).    Weight as of 23: 230 lb.    Physical Exam  Constitutional:       Appearance: Normal appearance.   HENT:      Head: Normocephalic and atraumatic.   Eyes:       Extraocular Movements: Extraocular movements intact.   Neck:      Musculoskeletal: Normal range of motion and neck supple.   Cardiovascular:      Pulses: Normal pulses.   Pulmonary:      Effort: Pulmonary effort is normal. No respiratory distress.   Abdominal:      General: There is no distension.   Skin:     General: Skin is warm.      Capillary Refill: Capillary refill takes less than 2 seconds.      Findings: No bruising.   Neurological:      General: No focal deficit present.      Mental Status: Alert.   Psychiatric:         Mood and Affect: Mood normal.     Examination of the right shoulder demonstrates:   Skin is intact, warm and dry.   Cervical:  Limitation in terminal 20 % ROM  Spurling's  Equivocally positive to the right    Deformity:   none  Atrophy:   none    Scapular winging: Negative    Palpation:     AC Joint   Negative  Biceps Tendon  Negative  Greater Tuberosity Negative    ROM:   Forward Flexion:  full and symmetric  Abduction:   full and symmetric  External Rotation:  full and symmetric  Internal Rotation:  full and symmetric    Rotator Cuff Strength:   Supraspinatus:   5/5  Subscapularis:   5/5  Infraspinatus/Teres: 5/5    Provocative Tests:   Rebolledo:   Negative  Speed's:   Positive  Albion's:   Positive  Lift-off:    Negative  Apprehension:  Negative  Sulcus Sign:   Negative    Neurovascular Upper Extremity (Bilateral)  Motor:    5/5 EPL, Finger Abduction, , Pinch, Deltoid  Sensation:   intact to light touch median, ulnar, radial and axillary nerve  Circulation:   Normal, 2+ radial pulse    The contralateral upper extremity is without limitation in range of motion or strength, no positive provocative maneuvers.     Radiographic Examination/Diagnostics:    Shoulder XR personally viewed, independently interpreted and radiology report was reviewed.    Radiographs demonstrate no evidence of osteoarthritis with well-maintained joint space.  No fractures or dislocations.     MRI OF THE CERVICAL  SPINE WITHOUT CONTRAST     COMPARISON:  [facility], XR CERVICAL SPINE ([id number]) (CPT=72050), 10/01/2013, 12:35.     INDICATIONS:  M54.12 Radiculopathy, cervical region M50.30 Other cervical disc degeneration, unspecified cervical region. Extreme neck pain. Right arm pain.     TECHNIQUE:  Multiplanar T1 and T2 weighted images including fat suppression sequences.  Images acquired in sagittal and axial planes.       FINDINGS:    There is reversal of the normal cervical lordosis with mild kyphosis centered at C5. There is right convex curvature of the cervical spine, a component of this may be positional. Vertebral body heights are maintained. There is scattered disc desiccation.   There is mild disc height loss at C6-C7. There is scattered endplate degenerative changes. No suspicious focal osseous lesion is seen. Cervical cord is normal in caliber and signal. The mid/lower cervical spinal canal may be mildly developmentally  slender.     C2-C3: Mild facet and uncinate hypertrophy. No spinal canal or foraminal narrowing.     C3-C4: Mild posterior disc osteophyte complex with minimal facet hypertrophy. No spinal canal or foraminal narrowing.     C4-C5: Trace posterior disc osteophyte complex which mild facet and uncinate hypertrophy. No spinal canal or foraminal narrowing.     C5-C6: Mild posterior disc osteophyte complex asymmetrically involving the right paracentral region. Mild facet and uncinate hypertrophy. Mild to moderate right and no left foraminal narrowing. Mild spinal canal stenosis. The thecal sac measures 8 mm in  AP dimension.     C6-C7: Mild to moderate posterior disc osteophyte complex extending into the right neural foramen. Mild to moderate facet and uncinate hypertrophy. Moderate to severe right foraminal narrowing. Mild to moderate left foraminal narrowing. No spinal canal  stenosis.     C7-T1: Unremarkable.      Impression   CONCLUSION:  Moderate degenerative changes in the cervical  spine most notable at C6-C7 where there is a mild to moderate posterior disc osteophyte complex which extends into the right neural foramen resulting in moderate to severe foraminal narrowing.  Clinical correlation for a right C7 radiculopathy is suggested. Please see above for further details.         MRI OF THE RIGHT SHOULDER WITHOUT CONTRAST     COMPARISON:  None.     INDICATIONS:  Status post hockey related injury to the right shoulder. Pain.     TECHNIQUE:  Multiplanar imaging of the shoulder including oblique coronal, axial and sagittal imaging was acquired including proton density fat suppression technique. Images were performed without intravenous gadolinium.     FINDINGS:    ROTATOR CUFF:  No significant tendinosis or evidence of tears.  No subacromial/subdeltoid bursitis.    MUSCULATURE:  No strain, edema, or atrophy.    AC JOINT/ACROMION:  No significant arthritis or acute injury.  Normal marrow and cortical signal. Type I acromion.  Normal subacromial space without evidence of subacromial impingement.    BICEPS/LABRAL COMPLEX:  Biceps tendon is intact without significant tendinosis. There is a SLAP lesion that extends from the biceps labral anchor into the posterior superior and posterior inferior quadrants. There is an associated para labral cyst along  the posterior superior quadrant that measures approximately 6 mm in AP dimension.  GLENOHUMERAL JOINT:  No significant arthritis or acute injury.  Normal marrow and cortical signal.  Unremarkable capsular insertions.  No effusion.           Impression   CONCLUSION:    1. There is a SLAP lesion extending from the biceps labral anchor into the posterior superior and posterior inferior quadrants of the labrum. There is an associated para labral cyst of the posterior superior quadrant.  2. No additional findings.          IMPRESSION: Keagan Teran is a 48 year old Right hand dominant male with right shoulder chronic SLAP tear and cervical radiculopathy  with prior evidence of C6-C7 DJD. Notable concurrent cluster headaches that are increasing in frequency.     We decided to order another MRI of shoulder and cervical spine to further charecterize internal derangement. I discussed that based on his physical examination, most likely his pain is attributed the cervical spine pathology with a more minor contribution from the SLAP tear.     PLAN:   We had a detailed discussion outlining the etiology, anatomy, pathophysiology, and natural history of patients finding's. Imaging was reviewed in detail and correlated to a 3-dimensional model of the shoulder.     In light of the chronicity of symptoms, loss of normal function, and  failure to progress conservatively we recommend an MRI to evaluate the integrity of the patient's finding's. The patient will follow up after imaging. Differential diagnosis includes but not limited to: rotator cuff/labral pathology, impingement, tendinopathy, cartilage injury/loose body, bone marrow edema, and osteoarthritis.     External records were also reviewed for pertinent historical findings contributing to the patients undiagnosed new problem with uncertain prognosis.     Neurology referral placed for cluster headache workup / treatment.     I discussed connecting him to my colleague Dr. Pitts for cervical spine assessment.     The patient had the opportunity to ask questions, and all questions were answered appropriately.      FOLLOW-UP:   Return to clinic after MRI completion.        Jennifer Pratt MD  Knee, Shoulder, & Elbow Surgery / Sports Medicine Specialist  Orthopaedic Surgery  [address] [place] [address].org  [email or web address]  t: [phone]  o: [phone]  f: [phone]    This note was dictated using Dragon software.  While it was briefly proofread prior to completion, some grammatical, spelling, and word choice errors due to dictation may still occurs.

## 2024-04-17 ENCOUNTER — OFFICE VISIT (OUTPATIENT)
Dept: ORTHOPEDICS CLINIC | Facility: CLINIC | Age: 49
End: 2024-04-17
Payer: COMMERCIAL

## 2024-04-17 DIAGNOSIS — S43.431A SUPERIOR GLENOID LABRUM LESION OF RIGHT SHOULDER, INITIAL ENCOUNTER: Primary | ICD-10-CM

## 2024-04-17 RX ORDER — KETOROLAC TROMETHAMINE 30 MG/ML
30 INJECTION, SOLUTION INTRAMUSCULAR; INTRAVENOUS ONCE
Status: COMPLETED | OUTPATIENT
Start: 2024-04-17 | End: 2024-04-17

## 2024-04-17 RX ORDER — TRIAMCINOLONE ACETONIDE 40 MG/ML
40 INJECTION, SUSPENSION INTRA-ARTICULAR; INTRAMUSCULAR ONCE
Status: COMPLETED | OUTPATIENT
Start: 2024-04-17 | End: 2024-04-17

## 2024-04-17 RX ADMIN — KETOROLAC TROMETHAMINE 30 MG: 30 INJECTION, SOLUTION INTRAMUSCULAR; INTRAVENOUS at 12:37:00

## 2024-04-17 RX ADMIN — TRIAMCINOLONE ACETONIDE 40 MG: 40 INJECTION, SUSPENSION INTRA-ARTICULAR; INTRAMUSCULAR at 12:37:00

## 2024-04-17 NOTE — PROGRESS NOTES
Orthopaedic Surgery  [address] [place] [address]  [phone]       Name: Keagan Teran   MRN: [id number]  Date: 4/17/2024     REASON FOR VISIT: MRI review of right shoulder pain     INTERVAL HISTORY:  Keagan Teran is a 48 year old right-hand dominant male who presents today for MRI review of the shoulder/C spine.    To summarize: right shoulder pain, he reports a previously diagnosed SLAP tear in 2014. Recently aggravated within the last several months while playing hockey. He is unable to workout and gets aggravated after lifting weights. Pain radiates nature from the neck to the shoulder, involving the medial border of the scapula.      Additional history of cervical spine degenerative changes that have been ongoing for many years.     Today, symptoms are largely unchanged since the previous visit.      He enjoys staying active and playing hockey and baseball. He works a .    PE:   There were no vitals filed for this visit.  Estimated body mass index is 32.78 kg/m² as calculated from the following:    Height as of 4/5/24: 5' 11\" (1.803 m).    Weight as of 4/5/24: 235 lb.    Physical Exam  Constitutional:       Appearance: Normal appearance.   HENT:      Head: Normocephalic and atraumatic.   Eyes:      Extraocular Movements: Extraocular movements intact.   Neck:      Musculoskeletal: Normal range of motion and neck supple.   Cardiovascular:      Pulses: Normal pulses.   Pulmonary:      Effort: Pulmonary effort is normal. No respiratory distress.   Abdominal:      General: There is no distension.   Skin:     General: Skin is warm.      Capillary Refill: Capillary refill takes less than 2 seconds.      Findings: No bruising.   Neurological:      General: No focal deficit present.      Mental Status: She is alert.   Psychiatric:         Mood and Affect: Mood normal.     Examination of the right shoulder demonstrates:     Skin is intact, warm and dry.   Cervical:  Limitation in  terminal 20 % ROM  Spurling's                           Equivocally positive to the right     Deformity:                         none  Atrophy:                             none    Scapular winging:Negative     Palpation:                            AC Joint                             Negative  Biceps Tendon                  Negative  Greater Tuberosity          Negative     ROM:   Forward Flexion:              full and symmetric  Abduction:                         full and symmetric  External Rotation:           full and symmetric  Internal Rotation:            full and symmetric     Rotator Cuff Strength:   Supraspinatus:                  5/5  Subscapularis:                   5/5  Infraspinatus/Teres:        5/5     Provocative Tests:   Rebolledo:                            Negative  Speed's:                             Positive  Nocona's:                           Positive  Lift-off:                                Negative  Apprehension:                  Negative  Sulcus Sign:                        Negative     Neurovascular Upper Extremity (Bilateral)  Motor:                                5/5 EPL, Finger Abduction, , Pinch, Deltoid  Sensation:                          intact to light touch median, ulnar, radial and axillary nerve  Circulation:                        Normal, 2+ radial pulse     The contralateral upper extremity is without limitation in range of motion or strength, no positive provocative maneuvers.     Radiographic Examination/Diagnostics:    Shoulder radiographs personally viewed, independently interpreted and radiology report was reviewed.    MRI SHOULDER, RIGHT (CPT=73221)    Result Date: 4/15/2024  Exam: MRI SHOULDER RT W/O CONTRAST CPT Code(s): 83468 - MRI JOINT UPR EXTREM W/O DYE CLINICAL HISTORY: Right shoulder pain, swelling, locking, weakness and limited range of motion. History of SLAP tear. TECHNIQUE: Non-infused, multiplanar and multi-sequential ultrahigh field 3.0 Tess MRI of  the right shoulder was performed. COMPARISON: Radiographs dated 4/5/2024 and MRI of the right shoulder report dated 4/2/2014. FINDINGS: There is mild increased signal in the supraspinatus and subscapularis tendons. Mild articular surface fraying of the supraspinatus tendon anteriorly is noted. No discrete rotator cuff tear, tendon retraction or muscle atrophy is seen. The biceps tendon appears intact. There is somewhat ill-defined abnormal increased signal extending laterally into the substance of the superior labrum and along the base of the posterosuperior, posterior and posteroinferior labrum. There is irregular abnormal increased signal in the anteroinferior labrum. Overall, the findings appear similar to the prior study. A normal variant sublabral foramen versus additional labral tear is seen anterosuperiorly. A 6 x 2 mm focus of fluid abuts the superior labrum anteriorly and an approximately  8 x 2 x 21 mm septated and elongated fluid signal intensity collection is seen draped along the posterosuperior labrum. There is no joint effusion. The glenohumeral joint space appears mildly narrowed. There is mild narrowing and capsular hypertrophy at the acromioclavicular joint with edema in the joint capsule and a type I anterior acromion process. There is edema-like signal in the bone marrow on either side of the acromioclavicular joint, new. No fracture is seen. The coracoclavicular ligament is intact. No bony destructive lesion is identified. The musculature demonstrates normal signal characteristics.   IMPRESSION:   1. Superior labral tear extending to involve the majority of the posterior labrum with probable anteroinferior labral tearing with paralabral cysts formation. Normal variant sublabral foramen versus additional tear is seen anterosuperiorly.   2. Mild rotator cuff tendinosis with mild articular surface fraying of the supraspinatus tendon, increased. No discrete rotator cuff tear, tendon retraction or  muscle atrophy is seen.   3. Mild to moderate degenerative changes of the acromioclavicular joint. There is new thickening and edema involving the joint capsule with bone marrow edema on either side of the joint. The findings likely represent a combination of degenerative and reactive type changes. Capsulitis posttraumatic changes could give a similar appearance. No fracture is identified.   This report was performed utilizing speech recognition software technology. Despite thorough proofreading, speech recognition errors could escape detection. If a word or phrase is confusing or out of context, please do not hesitate to call for clarification. Interpreting Radiologist: Jacques Munoz M.D. Electronically Signed: 04/15/2024 10:53 AM    z [facility]ight MRI SPINE CERVICAL (CPT=72141)    Result Date: 4/15/2024  Exam: MRI CERVICAL SP W/O CONTRAST CPT Code(s): 54919 - MRI NECK SPINE W/O DYE CLINICAL HISTORY: Cervical radiculopathy (M54.12). Right-sided radicular symptoms. COMPARISON: Cervical spine MRI, 3/20/2017, from [facility]. TECHNIQUE: Cervical spine MRI without contrast. Exam performed on an ultra high field 3.0 Tess MR scanner. FINDINGS: Mild reversal of the normal cervical lordosis. No cervical spondylolisthesis or fracture. Cervical vertebral body heights are normal. Degenerative endplate signal changes and anterior endplate spurring at C5-C6 and C6-C7. Cervical vertebral marrow signal is otherwise normal. The paraspinal musculature is symmetric and normal in morphology and signal. No cervical spinal cord signal abnormalities are identified. No Chiari malformation. C1-C2: The atlantoaxial articulation appears normal. No spinal stenosis. C2-C3: Disc desiccation and normal disc height. Minimal asymmetric disc bulge to the left. Minimal left facet arthrosis. No significant spinal or neural foraminal stenosis. C3-C4: Disc desiccation and normal disc height. Mild asymmetric disc bulge to the right. Minimal left facet  arthrosis. Borderline spinal stenosis (10 mm AP canal dimension). No significant neural foraminal stenosis. C4-C5: Disc desiccation and normal disc height. Mild asymmetric disc bulge to the left. No significant facet arthrosis. Borderline spinal stenosis (10 mm AP canal dimension). No significant neural foraminal stenosis. C5-C6: Disc desiccation and moderate disc space narrowing on the right side. Mild to moderate asymmetric disc bulge to the right and mild right-sided uncovertebral spurring. The disc indents the ventral thecal sac and mildly indents the right ventral aspect of the spinal cord. No significant facet arthrosis. Mild to moderate spinal stenosis (8 mm AP canal dimension). Mild to moderate right neural foraminal narrowing. C6-C7: Disc desiccation and severe disc space narrowing on the right side. Moderate asymmetric disc bulge to the right with bulging of the disc most pronounced at the right neural foramen. Mild left-sided and mild to moderate right-sided uncovertebral spurring. The disc/osteophyte complex indents the ventral thecal sac and minimally indents right ventral aspect of the spinal cord. No significant facet arthrosis. Mild spinal stenosis (9 mm AP canal dimension). Mild to moderate left and severe right neural foraminal narrowing. C7-T1: Disc height and signal are normal. No disc herniation or disc bulge. Minimal bilateral facet arthrosis. No significant spinal or neural foraminal stenosis. Sagittal sequences demonstrate mild to moderate mucosal thickening in the left maxillary sinus and mild mucosal thickening in the right maxillary sinus.     IMPRESSION: 1. Multilevel cervical spondylosis, most pronounced at the C5-C6 and C6-C7 disc levels. Minimal asymmetric disc bulge to the left at C2-C3. Mild asymmetric disc bulge to the right at C3-C4. Mild asymmetric disc bulge to the left at C4-C5. Mild to moderate asymmetric disc bulge to the right and mild right-sided uncovertebral spurring at  C5-C6. Moderate asymmetric disc bulge to the right and mild left-sided and mild to moderate right-sided uncovertebral spurring at C6-C7. Compared to the 2017 MRI there has been progressive disc space narrowing at C5-C6 and C6-C7. 2. Borderline spinal stenosis at C3-C4 and C4-C5. Mild to moderate spinal stenosis and mild to moderate right neural foraminal narrowing at C5-C6. Mild spinal stenosis and mild to moderate left and severe right neural foraminal narrowing at C6-C7. 3. The ventral aspect of the cervical spinal cord is indented at the C5-C6 and C6-C7 disc levels, although no cervical spinal cord signal abnormalities are identified. 4. Mild reversal of the normal cervical lordosis. No evidence of fracture. Interpreting Radiologist: Jonathan Osorio M.D. Electronically Signed: 04/15/2024 10:40 AM    XR SHOULDER, COMPLETE (MIN 2 VIEWS), RIGHT (CPT=73030)    Result Date: 4/5/2024  PROCEDURE:  XR SHOULDER, COMPLETE (MIN 2 VIEWS), RIGHT (CPT=73030)  TECHNIQUE:  Multiple views were obtained.  COMPARISON:  None.  INDICATIONS:  M25.511 Right shoulder pain, unspecified chronicity  PATIENT STATED HISTORY: (As transcribed by Technologist)  Jelena is having ortho evaluation. He shares he was diagnosed with a SLAP tear in his right shoulder and has been ahving a lot of pain.    FINDINGS:  There is no fracture, dislocation, or subluxation.  There is no lytic or blastic lesions.  The soft tissues are unremarkable.  The alignment of the bones is within normal limits.            CONCLUSION:  No acute disease.    LOCATION:  [facility]   Dictated by (CST): Pa Godoy MD on 4/05/2024 at 9:24 AM     Finalized by (CST): Pa Godoy MD on 4/05/2024 at 9:26 AM         IMPRESSION: Keagan Teran is a 48 year old with right superior glenoid labrum lesion /SLAP tear sustained 2014.  Additional evidence of multilevel cervical DJD.     We elected to maximize conservative management with intra-articular corticosteroid and ketorolac  injection. A referral to Dr. Pitts was provided to address concurrent Cervical spine pathology.    PLAN:   We had a detailed discussion outlining the etiology, anatomy, pathophysiology, and natural history of patient's findings of the shoulder. Imaging was reviewed in detail and correlated to a 3-dimensional model of the shoulder.      We reviewed the treatment of this disease condition.  Fortunately, treatment is amenable to conservative treatment which we chose to optimize at today's visit.  After a discussion of a variety of conservative treatment options, I recommended intra-articular injection with corticosteroid and ketorolac.       We elected to proceed with the injection procedure at today's visit. We discussed the risk and benefits of the procedure; including, but not limited to: infection, injury to blood vessels, nerve injury, prolonged pain, swelling, site soreness, failure to progress, and need for advanced treatments.  The patient voiced understanding and agreed to proceed with the treatment plan.    I briefly outlined the role for arthroscopic biceps tenodesis for addressing SLAP pathology definitively.       Patient was given a referral to Dr. Pitts to address spine pathology.     FOLLOW-UP:   Return to clinic on an as needed basis.       Jennifer Pratt MD  Knee, Shoulder, & Elbow Surgery / Sports Medicine Specialist  Orthopaedic Surgery  [address] [place] [address].org  [email or web address]  t: [phone]  o: [phone]  f: [phone]    This note was dictated using Dragon software.  While it was briefly proofread prior to completion, some grammatical, spelling, and word choice errors due to dictation may still occur.

## 2024-04-17 NOTE — PROCEDURES
Right Shoulder Glenohumeral Joint Injection    Name: Keagan Teran   MRN: [id number]  Date: 4/17/2024     Clinical Indications:   SLAP tear    After informed consent, the injection site was marked, sterilized with topical chlorhexidine antiseptic, and locally anesthetized with skin refrigerant.    The patient was seated upright and the shoulder was exposed. Using sterile technique: 1 mL of 30mg/mL of Ketorolac, 2 mL of 0.5% Bupivicaine, 2 mL of 1% Lidocaine, and 1 mg of 40mg/mL of Triamcinolone (Kenalog) was injected with a Anterior approach utilizing a 22 gauge needle.  A band-aid was applied.  The patient tolerated the procedure well.        Jennifer Pratt MD  Knee, Shoulder, & Elbow Surgery / Sports Medicine Specialist  Orthopaedic Surgery  [address] [place] [address] [id number] [place] [address].org  [email or web address]  t: [phone]  o: [phone]  f: [phone]

## 2024-04-22 DIAGNOSIS — M54.2 NECK PAIN: Primary | ICD-10-CM

## 2024-04-23 ENCOUNTER — HOSPITAL ENCOUNTER (OUTPATIENT)
Dept: GENERAL RADIOLOGY | Age: 49
Discharge: HOME | End: 2024-04-23
Attending: STUDENT IN AN ORGANIZED HEALTH CARE EDUCATION/TRAINING PROGRAM
Payer: COMMERCIAL

## 2024-04-23 ENCOUNTER — OFFICE VISIT (OUTPATIENT)
Dept: ORTHOPEDICS CLINIC | Facility: CLINIC | Age: 49
End: 2024-04-23
Payer: COMMERCIAL

## 2024-04-23 VITALS — HEIGHT: 71 IN | WEIGHT: 230 LBS | BODY MASS INDEX: 32.2 KG/M2

## 2024-04-23 DIAGNOSIS — M54.2 NECK PAIN: ICD-10-CM

## 2024-04-23 DIAGNOSIS — M54.12 CERVICAL RADICULITIS: Primary | ICD-10-CM

## 2024-04-23 PROCEDURE — 72050 X-RAY EXAM NECK SPINE 4/5VWS: CPT | Performed by: STUDENT IN AN ORGANIZED HEALTH CARE EDUCATION/TRAINING PROGRAM

## 2024-04-23 PROCEDURE — 3008F BODY MASS INDEX DOCD: CPT | Performed by: STUDENT IN AN ORGANIZED HEALTH CARE EDUCATION/TRAINING PROGRAM

## 2024-04-23 PROCEDURE — 99204 OFFICE O/P NEW MOD 45 MIN: CPT | Performed by: STUDENT IN AN ORGANIZED HEALTH CARE EDUCATION/TRAINING PROGRAM

## 2024-04-23 NOTE — H&P
[facility] - ORTHOPEDICS  [address] [place] [address], [address] [address]  [address] [place] [address]  [phone]     NEW PATIENT VISIT - HISTORY AND PHYSICAL EXAMINATION     Name: Keagan Teran   MRN: [id number]  Date: 04/23/24       CC: neck and arm pain    REFERRED BY: William Ortiz MD    HPI:   Keagan Teran is a very pleasant 48 year old male who presents today for evaluation of neck and arm pain. The distribution of symptoms are: 50% neck pain and 50% arm pain. The symptoms began 7 year(s) ago without any significant injury. Since the onset, the symptoms have become slowly worse over time. Patient feels pain is aggravated by neck movement and improved by rest. The patient reports numbness and no weakness.  The symptom characteristics are as follows: Patient is a 48-year-old male presenting with neck pain radiating down right upper extremity including shoulder, medial scapula and arm..  Symptoms have been present for many years with progressive worsening of symptoms.  patient was previously evaluated in 2017 by Dr. Painter and referred for physical therapy as well as injection therapy..    Prior spine surgery: none.    Bowel and bladder symptoms: absent.    The patient has not had issues with balance and/or hand dexterity problems such as changes in penmanship or the use of buttons or zippers.    Treatment up to this time has included:    Evaluation: PCP and other orthopedic surgeon  NSAIDS: have not been helpful  Narcotic use: None  Physical therapy: previously  Spinal injections: None      PMH:   Past Medical History:    Acute tonsillitis    Anxiety    Arthritis    Back pain    Concussion    Dizziness    Flatulence/gas pain/belching    Foot pain    soft tissue    Headache disorder    Labral tear of shoulder, right, subsequent encounter    Lipid screening    Lower back pain    Otitis media of left ear    Pain in joints    Personal history of tobacco use,  presenting hazards to health    Skin rash    Sore throat    Stress    URI (upper respiratory infection)    Viral labyrinthitis       PAST SURGICAL HX:  Past Surgical History:   Procedure Laterality Date    Other surgical history      urethra surgery, removal of cyst    Vasectomy Bilateral 10/15/2015    Dr. Loredo       FAMILY HX:  Family History   Problem Relation Age of Onset    Heart Attack Father     Diabetes Father     Colon Polyps Father     Heart Disease Father     Other (Other) Father     Uterine Cancer Mother     Other (CHF) Mother     Prostate Cancer Paternal Grandmother     Colon Polyps Sister     Prostate Cancer Paternal Grandfather     Colon Polyps Sister        ALLERGIES:  Bees    MEDICATIONS:   Current Outpatient Medications   Medication Sig Dispense Refill    GLUCOSAMINE SULFATE OR Take 2 capsules by mouth daily.      Multiple Vitamins-Minerals (MULTIVITAMIN OR) Take by mouth daily.        Omega-3 Fatty Acids (FISH OIL) 875 MG Oral Cap Take by mouth daily.        Cholecalciferol (VITAMIN D OR) Take 1,000 Units by mouth daily.        Thiamine HCl (VITAMIN B-1 OR) Take by mouth daily.           ROS: A comprehensive 14 point review of systems was performed and was negative aside from the aforementioned per history of present illness.    SOCIAL HX:  Social History     Tobacco Use    Smoking status: Former     Current packs/day: 0.00     Average packs/day: 1 pack/day for 15.0 years (15.0 ttl pk-yrs)     Types: Cigarettes     Start date: 1986     Quit date: 2001     Years since quittin.3    Smokeless tobacco: Never   Substance Use Topics    Alcohol use: Yes     Alcohol/week: 4.0 standard drinks of alcohol     Types: 2 Cans of beer, 2 Standard drinks or equivalent per week     Comment: several tiems per week       PE:   Vitals:    24 0830   Weight: 230 lb (104.3 kg)   Height: 5' 11\" (1.803 m)     Estimated body mass index is 32.08 kg/m² as calculated from the following:    Height as of  this encounter: 5' 11\" (1.803 m).    Weight as of this encounter: 230 lb (104.3 kg).    Physical Exam  Constitutional:       Appearance: Normal appearance.   HENT:      Head: Normocephalic and atraumatic.   Eyes:      Extraocular Movements: Extraocular movements intact.   Cardiovascular:      Pulses: Normal pulses. Skin warm and well perfused.  Pulmonary:      Effort: Pulmonary effort is normal. No respiratory distress.   Skin:     General: Skin is warm.   Psychiatric:         Mood and Affect: Mood normal.     Spine Exam:    Normal gait without difficulty  Able to heel, toe, tandem gait without difficulty  Level shoulders and hips in even stance    Restricted C-spine ROM    No tenderness to palpation of C-spine    Spluring: positive    Hightower's: negative  IRR: negative  Sustained clonus: negative     and release: normal  Rhomberg: normal    UE Strength: 5/5 D Bi Tri WE FDS IO  UE Sensation: normal in C5-T1 distribution  UE reflexes: normal    Radiographic Examination/Diagnostics:  XR and MRI personally viewed, independently interpreted and radiology report was reviewed.  X-ray of the cervical spine demonstrates moderate to severe degenerative changes at C5-6 and C6-7  MRI of the cervical spine demonstrates moderate to  severe bilateral foraminal stenosis at C5-6 and C6-7    IMPRESSION: Keagan Teran is a 48 year old male with cervical stenosis and radiculitis    PLAN:     We reviewed the patients history, symptoms, exam findings, and imaging today.  We had a detailed discussion outlining the etiology, anatomy, pathophysiology, and natural history of cervical stenosis. The typical management of this condition may include lifestyle modification, NSAIDs, physical therapy, oral steroids, epidural injections, neuromodulatory medications, and sometimes pain medications.  Based on our discussion today we would like to have the patient initiate our recommendations for continued conservative therapy in the treatment  of their condition noted in the assessment section.    -Referred patient to PT and [facility]  -Given the duration of symptoms as well as the severity of stenosis, discussed with patient decompressive options including ACDF versus cervical disc replacement  -Consider ACDF if symptoms do not improve with injection    FOLLOW-UP:  We will see him back in follow-up in one month, or sooner if any problems arise. Patient understands and agrees with plan.       Alex Pitts MD  Orthopedic Spine Surgeon  [facility]   [address] [place] [address].[facility]  [email or web address]  t: [phone]   f: [phone]        This note was dictated using Dragon software.  While it was briefly proofread prior to completion, some grammatical, spelling, and word choice errors due to dictation may still occur.

## 2024-04-29 ENCOUNTER — PATIENT MESSAGE (OUTPATIENT)
Dept: ORTHOPEDICS CLINIC | Facility: CLINIC | Age: 49
End: 2024-04-29

## 2024-04-29 NOTE — TELEPHONE ENCOUNTER
From: Keagan Teran  To: Alex Pitts  Sent: 4/29/2024 3:19 PM CDT  Subject: Pain and discomfort    I scheduled a visit with the pain specialist on May 8th and they won’t be able to get me in for an injection for another week or two after that.     My right shoulder has been bothering me much more since the end of last week. I don’t have a lot of strength in my right arm, it hurts to being a jug or coffee cup to my mouth and it’s waking up throughout the night.    Is there anything can do to relieve the pain, anti-inflammatory? It’s not the same as I was experiencing before the cortisone in my shoulder. It’s less scapula and more direct in the shoulder. Sensitive to touch even.     Probably unrelated but I have also been getting a sharp pain on my right foot, mostly between the second and third toe. It’s a very sharp pain that doesn’t allow me to put any weight on my foot. It comes and goes, happened 2 or 3 times since Friday lasting many hours/half the day. It’s much worse barefoot than with soft shoes or oofos sandals.

## 2024-04-30 RX ORDER — MELOXICAM 15 MG/1
15 TABLET ORAL DAILY
Qty: 14 TABLET | Refills: 1 | Status: SHIPPED | OUTPATIENT
Start: 2024-04-30

## 2024-04-30 NOTE — TELEPHONE ENCOUNTER
LOV 4/23/24 referred to pain clinic, NSAIDs and PT recommended.  Worsening rt shoulder pain. R arm weakness present.  Meantions sharp right foot pain that affects ambulation - unclear if related.  Does not appear he is scheduled yet for PT.  Ibuprofen and tylenol not helping.  Pain clinic appt not until May 8th.  Pt asking if different NSAID can be prescribed in meantime.      \"My right shoulder has been bothering me much more since the end of last week.  I don’t have a lot of strength in my right arm, it hurts to being a jug or coffee cup to my mouth and it’s waking up throughout the night.     Is there anything can do to relieve the pain, anti-inflammatory?  It’s not the same as I was experiencing before the cortisone in my shoulder.  It’s less scapula and more direct in the shoulder.  Sensitive to touch even.      Probably unrelated but I have also been getting a sharp pain on my right foot, mostly between the second and third toe.  It’s a very sharp pain that doesn’t allow me to put any weight on my foot.  It comes and goes, happened 2 or 3 times since Friday lasting many hours/half the day.  It’s much worse barefoot than with soft shoes or oofos sandals. \"    Future Appointments   Date Time Provider Department Center   5/8/2024 10:00 AM Petar Salgado PA [facility]     TO SCHEDULE F/U VISIT WITH DR SHEPARD.

## 2024-05-01 ENCOUNTER — MED REC SCAN ONLY (OUTPATIENT)
Facility: CLINIC | Age: 49
End: 2024-05-01

## 2024-05-08 ENCOUNTER — OFFICE VISIT (OUTPATIENT)
Dept: PAIN CLINIC | Facility: CLINIC | Age: 49
End: 2024-05-08
Payer: COMMERCIAL

## 2024-05-08 VITALS
DIASTOLIC BLOOD PRESSURE: 72 MMHG | SYSTOLIC BLOOD PRESSURE: 116 MMHG | BODY MASS INDEX: 32 KG/M2 | WEIGHT: 230 LBS | HEART RATE: 75 BPM | OXYGEN SATURATION: 97 %

## 2024-05-08 DIAGNOSIS — M54.12 RADICULITIS OF RIGHT CERVICAL REGION: ICD-10-CM

## 2024-05-08 DIAGNOSIS — M48.02 FORAMINAL STENOSIS OF CERVICAL REGION: Primary | ICD-10-CM

## 2024-05-08 PROCEDURE — 3074F SYST BP LT 130 MM HG: CPT | Performed by: PHYSICIAN ASSISTANT

## 2024-05-08 PROCEDURE — 3078F DIAST BP <80 MM HG: CPT | Performed by: PHYSICIAN ASSISTANT

## 2024-05-08 PROCEDURE — 99214 OFFICE O/P EST MOD 30 MIN: CPT | Performed by: PHYSICIAN ASSISTANT

## 2024-05-08 NOTE — PROGRESS NOTES
Patient presents in office today with reported pain in head neck and right arm till elbow    Current pain level reported = 5/10    Last reported dose of NA today      Narcotic Contract renewal NA    Urine Drug screen NA

## 2024-05-08 NOTE — PROGRESS NOTES
Location of Pain: head neck and right arm till elbow     Date Pain Began:  progressed 4-6 months ago          Work Related:   No        Receiving Work Comp/Disability:   No    Numeric Rating Scale:  Pain at Present:  5                                                                                                            (No Pain) 0  to  10 (Worst Pain)                 Minimum Pain:   2  Maximum Pain  9    Distribution of Pain:    right    Quality of Pain:   throbbing and shooting, sharp, aching, numbness, tingling    Origin of Pain:    Other Sports    Aggravating Factors:    Other Movement    Past Treatments for Current Pain Condition:   Physical Therapy and Other Medications, Dry needling    Prior diagnostic testing for your pain:  Xray, MRI

## 2024-05-08 NOTE — PATIENT INSTRUCTIONS
Refill policies:    Allow 2-3 business days for refills; controlled substances may take longer.  Contact your pharmacy at least 5 days prior to running out of medication and have them send an electronic request or submit request through the “request refill” option in your [employer] account.  Refills are not addressed on weekends; covering physicians do not authorize routine medications on weekends.  No narcotics or controlled substances are refilled after noon on Fridays or by on call physicians.  By law, narcotics must be electronically prescribed.  A 30 day supply with no refills is the maximum allowed.  If your prescription is due for a refill, you may be due for a follow up appointment.  To best provide you care, patients receiving routine medications need to be seen at least once a year.  Patients receiving narcotic/controlled substance medications need to be seen at least once every 3 months.  In the event that your preferred pharmacy does not have the requested medication in stock (e.g. Backordered), it is your responsibility to find another pharmacy that has the requested medication available.  We will gladly send a new prescription to that pharmacy at your request.    Scheduling Tests:    If your physician has ordered radiology tests such as MRI or CT scans, please contact Central Scheduling at [phone] right away to schedule the test.  Once scheduled, the [facility] Centralized Referral Team will work with your insurance carrier to obtain pre-certification or prior authorization.  Depending on your insurance carrier, approval may take 3-10 days.  It is highly recommended patients assure they have received an authorization before having a test performed.  If test is done without insurance authorization, patient may be responsible for the entire amount billed.      Precertification and Prior Authorizations:  If your physician has recommended that you have a procedure or additional testing performed the [facility]  Centralized Referral Team will contact your insurance carrier to obtain pre-certification or prior authorization.    You are strongly encouraged to contact your insurance carrier to verify that your procedure/test has been approved and is a COVERED benefit.  Although the [facility] Centralized Referral Team does its due diligence, the insurance carrier gives the disclaimer that \"Although the procedure is authorized, this does not guarantee payment.\"    Ultimately the patient is responsible for payment.   Thank you for your understanding in this matter.  Paperwork Completion:  If you require FMLA or disability paperwork for your recovery, please make sure to either drop it off or have it faxed to our office at [phone]. Be sure the form has your name and date of birth on it.  The form will be faxed to our Forms Department and they will complete it for you.  There is a 25$ fee for all forms that need to be filled out.  Please be aware there is a 10-14 day turnaround time.  You will need to sign a release of information (ABBEY) form if your paperwork does not come with one.  You may call the Forms Department with any questions at [phone].  Their fax number is [phone].

## 2024-05-08 NOTE — PROGRESS NOTES
Patient: Keagan Teran  Medical Record Number: [id number]  Site: [facility]  Referring Physician:  Alex Pitts  PCP: Dr. Ortiz    Dear Dr. Pitts:    Thank you very much for requesting this consultation. I had the opportunity to evaluate and initiate care for your patient today, as per your request.    HISTORY OF CHIEF COMPLAINT:      Keagan Teran is a 48 year old male, who complains of neck pain with right upper extremity radicular pain.    Patient is here today with pain in above-described distribution that is been ongoing for at least 7 years.  He had been seen by this clinic in 2017, and had discussed LUZ, though never went forward with it.  Since that time, has continued with intermittent pain, though this has again become severe over past 6 months.  He has been to PT, without resolution of pain, and is diligent with HEP, again, without relief.  He has been to chiropractor with dry needling, again, without resolution of pain.  He was seen by ortho, and was sent for MRI of shoulder and C spine.  Underwent shoulder injection, as MRI did show labral tear, excellent initial relief, and in fact, continues to be substantially improved over baseline.  Sent to spine surgery, and was sent for CESIs.  He was given meloxicam, to partial relief.      VAS Pain Score:  5/10    Hand Dominance: right  Loss of dexterity: No  Dropping things: No    Aggravating Factors: Relieving Factors:   Use of R UE  Rapid change in position  Limiting activity      Past Treatment Attempted/Patient’s Response:  As above     Past Medical History:    Acute tonsillitis    Anxiety    Arthritis    Back pain    Concussion    Dizziness    Flatulence/gas pain/belching    Foot pain    soft tissue    Headache disorder    Labral tear of shoulder, right, subsequent encounter    Lipid screening    Lower back pain    Otitis media of left ear    Pain in joints    Personal history of tobacco use, presenting hazards to health    Skin rash     Sore throat    Stress    URI (upper respiratory infection)    Viral labyrinthitis      Past Surgical History:   Procedure Laterality Date    Other surgical history      urethra surgery, removal of cyst    Vasectomy Bilateral 10/15/2015    Dr. Loredo      Family History   Problem Relation Age of Onset    Heart Attack Father     Diabetes Father     Colon Polyps Father     Heart Disease Father     Other (Other) Father     Uterine Cancer Mother     Other (CHF) Mother     Prostate Cancer Paternal Grandmother     Colon Polyps Sister     Prostate Cancer Paternal Grandfather     Colon Polyps Sister       Social History     Socioeconomic History    Marital status:    Occupational History    Occupation: [employer]   Tobacco Use    Smoking status: Former     Current packs/day: 0.00     Average packs/day: 1 pack/day for 15.0 years (15.0 ttl pk-yrs)     Types: Cigarettes     Start date: 1986     Quit date: 2001     Years since quittin.3    Smokeless tobacco: Never   Vaping Use    Vaping status: Never Used   Substance and Sexual Activity    Alcohol use: Yes     Alcohol/week: 4.0 standard drinks of alcohol     Types: 2 Cans of beer, 2 Standard drinks or equivalent per week     Comment: several tiems per week    Drug use: No   Other Topics Concern    Caffeine Concern Yes     Comment: 3-4 cups coffee daily    Sleep Concern Yes     Comment: wakes at night sometimes    Exercise Yes     Comment: ng to stretch    Seat Belt Yes      Current Medications:  Current Outpatient Medications   Medication Sig Dispense Refill    Meloxicam 15 MG Oral Tab Take 1 tablet (15 mg total) by mouth daily. 14 tablet 1    GLUCOSAMINE SULFATE OR Take 2 capsules by mouth daily.      Multiple Vitamins-Minerals (MULTIVITAMIN OR) Take by mouth daily.        Omega-3 Fatty Acids (FISH OIL) 875 MG Oral Cap Take by mouth daily.        Cholecalciferol (VITAMIN D OR) Take 1,000 Units by mouth daily.        Thiamine HCl (VITAMIN B-1 OR) Take by  mouth daily.            Functional Assessment: Patient reports that they are able to complete all of their ADL's such as eating, bathing, using the toilet, dressing and getting up from a bed or a chair independently.    Work History:  The patient currently works full time as .       REVIEW OF SYSTEMS:   10 point review of systems is otherwise negative,unless otherwise in HPI.      Radiology/Lab Test Reviewed:  MRI C spine 4/15/24:    C1-C2: The atlantoaxial articulation appears normal. No spinal stenosis.     C2-C3: Disc desiccation and normal disc height. Minimal asymmetric disc bulge to the left. Minimal left facet arthrosis. No significant spinal or neural foraminal stenosis.     C3-C4: Disc desiccation and normal disc height. Mild asymmetric disc bulge to the right. Minimal left facet arthrosis. Borderline spinal stenosis (10 mm AP canal dimension). No significant neural foraminal stenosis.     C4-C5: Disc desiccation and normal disc height. Mild asymmetric disc bulge to the left. No significant facet arthrosis. Borderline spinal stenosis (10 mm AP canal dimension). No significant neural foraminal stenosis.     C5-C6: Disc desiccation and moderate disc space narrowing on the right side. Mild to moderate asymmetric disc bulge to the right and mild right-sided uncovertebral spurring. The disc indents the ventral thecal sac and mildly indents the right ventral aspect of the spinal cord. No significant facet arthrosis. Mild to moderate spinal stenosis (8 mm AP canal dimension). Mild to moderate right neural foraminal narrowing.     C6-C7: Disc desiccation and severe disc space narrowing on the right side. Moderate asymmetric disc bulge to the right with bulging of the disc most pronounced at the right neural foramen. Mild left-sided and mild to moderate right-sided uncovertebral spurring. The disc/osteophyte complex indents the ventral thecal sac and minimally indents right ventral aspect of the spinal  cord. No significant facet arthrosis. Mild spinal stenosis (9 mm AP canal dimension). Mild to moderate left and severe right neural foraminal narrowing.     C7-T1: Disc height and signal are normal. No disc herniation or disc bulge. Minimal bilateral facet arthrosis. No significant spinal or neural foraminal stenosis.             CBC:    Lab Results   Component Value Date    WBC 5.7 02/20/2015    WBC 6.9 05/12/2012   No results found for: \"HEMOGLOBIN\"  Lab Results   Component Value Date     02/20/2015     05/12/2012       PHYSICAL EXAMIMATION   PHYSICAL EXAMINATION: Keagan Teran is a 48 year old male who is observed sitting comfortably on a chair in the exam room alert and oriented times three. He looks consistent with his stated age.    Ht Readings from Last 1 Encounters:   04/23/24 71\"     Wt Readings from Last 1 Encounters:   05/08/24 230 lb (104.3 kg)     The patient is well developed, well nourished, normal body habitus, well muscled. He moves independently from sitting to standing with ease.       Coordination:  Well coordinated; able to engage in rapid alternating movements bilateral upper extremities    Tandem Walk: Able    ROM Cervical Spine:  See chart below:  Motion Right (+ or -) Left (+ or -)   Cervical flexion - -   Cervical extension - -   Cervical lateral bending - + on R   Cervical rotation - -     Integument:  Skin over area of cervical spine intact; no erythema, rashes, excoriations, lesions noted    Palpation:  See chart below:  Palpation of Right (+ or -) Left (+ or -)   Cervical Facets - -   Thoracic Facets - -   Paraspinal - -   Trapezius - -   Scapula - -   Occipital - -     Strength:  Strength of bilateral upper extremities grossly intact; 5/5 throughout    Sensation:  Normal sensation noted to light touch and pressure throughout bilateral upper extremities.    Tests:  Test Right (+ or -) Left (+ or -)   Spurling - -   Hightower’s Test     Clonus       HEAD/NECK: Head is  normocephalic, neck supple  EYES: EOMI, MARSHAL  LYMPH EXAM: There is no lymph edema in either lower extremity.  VASCULAR EXAM: Radial pulses are normal bilaterally, with good distal perfusion. No clubbing or cyanosis.  HEART: normal, regular, S1 and S2  LUNGS: CTA  MUSCULOSKELETAL: Smooth, pain-free ROM to bilateral shoulders,elbows, wrists and digits.    Do you have any known blood/bleeding disorders?  No  Does patient currently take blood thinners?   None  Does patient currently take any antibiotics?   No    Patient is currently on pain medications:  No  Reason pain medications are prescribed: N/A  Pain medications are prescribed by: N/A  [place] Prescription Monitoring review: N/A  DIRE: N/A  Treatment decision: N/A    MEDICAL DECISION MAKING:   Impression: Cervical foraminal stenosis, cervical radiculitis, right shoulder labral tear    Patient has neck and radiating right shoulder pain into the upper arm not passing the elbow in the setting of MRI evidence of significant right C5-6 and C6-7 foraminal stenosis, though also with MRI proven labral tear of the right shoulder.  Symptoms have been intermittently present for years, and has failed chiropractic care, dry needling, physical therapy, and HEP.  He had been given a right shoulder injection with Ortho 4/17/2024, which produced profound improvement for 4 days, followed by return of some of his pain.  That said, he is certainly still significantly improved over baseline.  He has recently been given meloxicam, which he feels may be contributing to this improvement as well.    On exam, does have pain with range of motion of the cervical spine, no focal sensory/motor deficits.  Does have reduction with range of motion of the right shoulder with right behind the back reach as compared to left.    He has been evaluated by spine surgery, and was sent for consideration of cervical epidural steroid injection, risks and benefits of which were reviewed in detail.  Order  placed.  That said, pain is still significantly improved having had a right shoulder injection, and is set to restart physical therapy.  Encouraged him to continue with conservative management, though if ineffective, will be happy to proceed with LUZ, as requested.    Plan: Continue with conservative management to include PT with HEP.  If ineffective, we will proceed with LUZ (orders placed).  Follow-up 2 to 3 weeks after injection.      The patient indicates understanding of these issues and agrees to the plan.      Thank you very much.     Respectfully yours,    MICHAEL Calero

## 2024-05-09 ENCOUNTER — TELEPHONE (OUTPATIENT)
Dept: PAIN CLINIC | Facility: CLINIC | Age: 49
End: 2024-05-09

## 2024-05-09 NOTE — TELEPHONE ENCOUNTER
Prior Authorization for LUZ    initiated with  [facility]  CPT codes: 14645   pending reference #[id number]  Clinical notes submitted via fax to [phone]

## 2024-05-10 ENCOUNTER — TELEPHONE (OUTPATIENT)
Dept: PAIN CLINIC | Facility: CLINIC | Age: 49
End: 2024-05-10

## 2024-05-10 NOTE — TELEPHONE ENCOUNTER
Prior authorization request completed for: LUZ  Authorization # [id number]  Authorization dates: 5/9/24-6/8/24  CPT codes approved: 55722  Number of visits/dates of service approved: 1  Physician: HALIE  Location: [facility]     Patient can be scheduled. Routed to Navigator.

## 2024-05-13 NOTE — TELEPHONE ENCOUNTER
Contacted patient advised that insurance approval was received and can be scheduled now for injection. Patient mentioned that he would like to hold off on scheduling injection. Advised patient that when he is ready to move forward w/injection can call office back, insurance approval is valid until 06/08/24, but can reach out to insurance for extension if needed. Patient ALHAJI.

## 2024-06-11 RX ORDER — MELOXICAM 15 MG/1
15 TABLET ORAL DAILY
Qty: 14 TABLET | Refills: 1 | Status: SHIPPED | OUTPATIENT
Start: 2024-06-11

## 2024-06-11 NOTE — TELEPHONE ENCOUNTER
Meloxicam    DOS: n/a  Last OV: 4/23/24  Last refill date: 4/30/24 #/refills: 14/1  Upcoming appt:   Future Appointments   Date Time Provider Department Center   7/10/2024  1:00 PM Benjamin Bush MD [facility]     Patient comment: I tried going off thr medication after finishing the first bottle and starting PT.  Pain intensified after 4-7 days reflled thr Rx and started feeling better after 3-4 days being back on it.  I am about out of that refill, am i able to get a refill so i am covered until my epidural injections which is scheduled for the end of this month?     Component      Latest Ref Rng 10/27/2022   Glucose      70 - 99 mg/dL 89    Sodium      136 - 145 mmol/L 139    Potassium      3.5 - 5.1 mmol/L 4.2    Chloride      98 - 112 mmol/L 106    Carbon Dioxide, Total      21.0 - 32.0 mmol/L 26.0    ANION GAP      0 - 18 mmol/L 7    BUN      7 - 18 mg/dL 18    CREATININE      0.70 - 1.30 mg/dL 0.97    CALCIUM      8.5 - 10.1 mg/dL 9.4    CALCULATED OSMOLALITY      275 - 295 mOsm/kg 289    EGFR      >=60 mL/min/1.73m2 98    AST (SGOT)      15 - 37 U/L 12 (L)    ALT (SGPT)      16 - 61 U/L 28    ALKALINE PHOSPHATASE      45 - 117 U/L 53    Total Bilirubin      0.1 - 2.0 mg/dL 0.6    PROTEIN, TOTAL      6.4 - 8.2 g/dL 7.7    Albumin      3.4 - 5.0 g/dL 4.2    Globulin      2.8 - 4.4 g/dL 3.5    A/G Ratio      1.0 - 2.0  1.2    Patient Fasting for CMP? Yes       Legend:  (L) Low

## 2024-06-25 ENCOUNTER — APPOINTMENT (OUTPATIENT)
Dept: GENERAL RADIOLOGY | Facility: HOSPITAL | Age: 49
End: 2024-06-25
Attending: ANESTHESIOLOGY

## 2024-06-25 ENCOUNTER — HOSPITAL ENCOUNTER (OUTPATIENT)
Facility: HOSPITAL | Age: 49
Setting detail: HOSPITAL OUTPATIENT SURGERY
Discharge: HOME | End: 2024-06-25
Attending: ANESTHESIOLOGY | Admitting: ANESTHESIOLOGY

## 2024-06-25 VITALS
RESPIRATION RATE: 18 BRPM | TEMPERATURE: 98 F | SYSTOLIC BLOOD PRESSURE: 129 MMHG | OXYGEN SATURATION: 98 % | HEART RATE: 73 BPM | DIASTOLIC BLOOD PRESSURE: 89 MMHG

## 2024-06-25 PROCEDURE — 3E0S33Z INTRODUCTION OF ANTI-INFLAMMATORY INTO EPIDURAL SPACE, PERCUTANEOUS APPROACH: ICD-10-PCS | Performed by: ANESTHESIOLOGY

## 2024-06-25 PROCEDURE — 62321 NJX INTERLAMINAR CRV/THRC: CPT | Performed by: ANESTHESIOLOGY

## 2024-06-25 RX ORDER — ONDANSETRON 2 MG/ML
4 INJECTION INTRAMUSCULAR; INTRAVENOUS ONCE AS NEEDED
Status: DISCONTINUED | OUTPATIENT
Start: 2024-06-25 | End: 2024-06-25

## 2024-06-25 RX ORDER — DEXAMETHASONE SODIUM PHOSPHATE 10 MG/ML
INJECTION, SOLUTION INTRAMUSCULAR; INTRAVENOUS
Status: DISCONTINUED | OUTPATIENT
Start: 2024-06-25 | End: 2024-06-25

## 2024-06-25 RX ORDER — LIDOCAINE HYDROCHLORIDE 10 MG/ML
INJECTION, SOLUTION EPIDURAL; INFILTRATION; INTRACAUDAL; PERINEURAL
Status: DISCONTINUED | OUTPATIENT
Start: 2024-06-25 | End: 2024-06-25

## 2024-06-25 RX ORDER — SODIUM CHLORIDE 9 MG/ML
INJECTION, SOLUTION INTRAMUSCULAR; INTRAVENOUS; SUBCUTANEOUS
Status: DISCONTINUED | OUTPATIENT
Start: 2024-06-25 | End: 2024-06-25

## 2024-06-25 RX ORDER — SODIUM CHLORIDE, SODIUM LACTATE, POTASSIUM CHLORIDE, CALCIUM CHLORIDE 600; 310; 30; 20 MG/100ML; MG/100ML; MG/100ML; MG/100ML
100 INJECTION, SOLUTION INTRAVENOUS CONTINUOUS
Status: DISCONTINUED | OUTPATIENT
Start: 2024-06-25 | End: 2024-06-25

## 2024-06-25 NOTE — DISCHARGE INSTRUCTIONS
Home Care Instructions Following Your Pain Procedure     Keagan,  It has been a pleasure to have you as our patient. To help you at home, you must follow these general discharge instructions. We will review these with you before you are discharged. It is our hope that you have a complete and uneventful recovery from our procedure.     General Instructions:  What to Expect:  Bandages from your procedure today can be removed when you get home.  Please avoid soaking and/or swimming for 24 hours.  Showering is okay  It is normal to have increased pain symptoms and/or pain at injection site for up to 3-5 days after procedure, you can use heat or ice (20 minutes on 20 minutes off) for comfort.  You may experience some temporary side effects which may include restlessness or insomnia, flushing of the face, or heart palpitations.  Please contact the provider if these symptoms do not resolve within 3-4 days.  Lightheadedness or nausea may occur and should resolve within 24 to 48 hours.  If you develop a headache after treatment, rest, drink fluids (with caffeine, if possible) and take mild over-the-counter pain medication.  If the headache does not improve with the above treatment, contact the physician.  Home Medications:  Resume all previously prescribed medication.  Please avoid taking NSAIDs (Non-Steriodal Anti-Inflammatory Drugs) such as:  Ibuprofen ( Advil, Motrin) Aleve (Naproxen), Diclofenac, Meloxicam for 6 hours after procedure.   If you are on Coumadin (Warfarin) or any other anti-coagulant (or \"blood thinning\") medication such as Plavix (Clopidogrel), Xarelto (Rivaroxaban), Eliquis (Apixaban), Effient (Prasugrel) etc., restart on the following day from the procedure unless otherwise directed by your provider.  If you are a diabetic, please increase the frequency of your glucose monitoring after the procedure as steroids may cause a temporary (2-3 day) increase in your blood sugar.  Contact your primary care  physician if your blood sugar remains elevated as you may require some medication adjustment.  Diet:  Resume your regular diet as tolerated.  Activity:  We recommend that you relax and rest during the rest of your procedure day.  If you feel weakness in your arms or legs do not drive.  Follow-up Appointment  Please schedule a follow-up visit within 3 to 4 weeks after your last procedure date.  Question or Concerns:  Feel free to call our office with any questions or concerns at [phone] (option #2)    Keagan  Thank you for coming to [facility] for your procedure.  The nurses try very hard to make sure you receive the best care possible.  Your trust in them as well as us is greatly appreciated.    Thanks so much,   Dr. Benjamin Bush

## 2024-06-25 NOTE — OPERATIVE REPORT
[facility]  Operative Report  2024     Keagan Teran Patient Status:  [facility] Outpatient Surgery    10/24/1975 MRN [id number]   Location [facility] Attending Benjamin Bush MD   Hosp Day # 0 PCP William Ortiz MD     Indication: Keagan is a 48 year old male with cervical radiculitis    Preoperative Diagnosis:  Cervical radiculitis [M54.12]    Postoperative Diagnosis: Same as above.    Procedure performed: CERVICAL EPIDURAL STEROID INJECTION with local    Anesthesia: Local      EBL: Less than 1 ml.    Procedure Description:  After reviewing the patient's history and performing a focused physical examination, the diagnosis was confirmed and contraindications such as infection and coagulopathy were ruled out.  Following review of allergies, potential side effects, and complications, including but not necessarily limited to infection, allergic reaction, local tissue breakdown, nerve injury, post-dural puncture headache and paresis, the patient indicated they understood and agreed to proceed.  After obtaining the informed consent, the patient was brought to the procedure room and monitored.     The patient was brought to the procedure room and positioned prone.  After comprehensive monitors were applied, the patient's neck was prepped and draped sterilely.  After local anesthetic was instilled in the skin and subcutaneous tissue, a 20-gauge Tuohy needle was introduced and advanced under fluoroscopy at C7-T1.  The epidural space was reached by using a loss of resistance to air technique. There was no C.S.F. or blood through the needle. After obtaining a good epidurogram by injecting Omnipaque 180 1 mL, a combination of normal saline and dexamethasone 10 mg in total volume of 4 mL was injected.  The needle was then flushed with normal saline 1 mL.  The stylet re-applied.  The needle was withdrawn with the tip intact.  The patient tolerated the procedure very well and recovered and  was discharged to a responsible adult after discharge criteria were met.        Complications: None.    Follow up:  The patient was followed in the pain clinic as needed basis.          Benjamin Bush MD

## 2024-06-25 NOTE — H&P
History & Physical Examination    Patient Name: Keagan Teran  MRN: [id number]  [facility]: [id number]  YOB: 1975    Pre-Operative Diagnosis:  Cervical radiculitis [M54.12]    Present Illness: Cervical radiculitis    ASA: 1  MP class: 1  Sedation: Local     Facility-Administered Medications Prior to Admission   Medication Dose Route Frequency Provider Last Rate Last Admin    [COMPLETED] triamcinolone acetonide (Kenalog-40) 40 MG/ML injection 40 mg  40 mg Intra-articular Once Jennifer Pratt MD   40 mg at 04/17/24 1237    [COMPLETED] ketorolac (Toradol) 30 MG/ML injection 30 mg  30 mg Intramuscular Once Jennifer Pratt MD   30 mg at 04/17/24 1237     Medications Prior to Admission   Medication Sig Dispense Refill Last Dose    Meloxicam 15 MG Oral Tab Take 1 tablet (15 mg total) by mouth daily. 14 tablet 1 6/21/2024    GLUCOSAMINE SULFATE OR Take 2 capsules by mouth daily.       Multiple Vitamins-Minerals (MULTIVITAMIN OR) Take by mouth daily.         Omega-3 Fatty Acids (FISH OIL) 875 MG Oral Cap Take by mouth daily.     6/21/2024    Cholecalciferol (VITAMIN D OR) Take 1,000 Units by mouth daily.         Thiamine HCl (VITAMIN B-1 OR) Take by mouth daily.          Current Facility-Administered Medications   Medication Dose Route Frequency    lactated ringers infusion  100 mL/hr Intravenous Continuous    ondansetron (Zofran) 4 MG/2ML injection 4 mg  4 mg Intravenous Once PRN       Allergies:   Allergies   Allergen Reactions    Bees        Past Medical History:    Acute tonsillitis    Anxiety    Arthritis    Back pain    Concussion    Dizziness    Flatulence/gas pain/belching    Foot pain    soft tissue    Headache disorder    Labral tear of shoulder, right, subsequent encounter    Lipid screening    Lower back pain    Otitis media of left ear    Pain in joints    Personal history of tobacco use, presenting hazards to health    Skin rash    Sore throat    Stress    URI (upper respiratory infection)    Viral  labyrinthitis     Past Surgical History:   Procedure Laterality Date    Other surgical history      urethra surgery, removal of cyst    Vasectomy Bilateral 10/15/2015    Dr. Loredo     Family History   Problem Relation Age of Onset    Heart Attack Father     Diabetes Father     Colon Polyps Father     Heart Disease Father     Other (Other) Father     Uterine Cancer Mother     Other (CHF) Mother     Prostate Cancer Paternal Grandmother     Colon Polyps Sister     Prostate Cancer Paternal Grandfather     Colon Polyps Sister      Social History     Tobacco Use    Smoking status: Former     Current packs/day: 0.00     Average packs/day: 1 pack/day for 15.0 years (15.0 ttl pk-yrs)     Types: Cigarettes     Start date: 1986     Quit date: 2001     Years since quittin.4    Smokeless tobacco: Never   Substance Use Topics    Alcohol use: Yes     Alcohol/week: 4.0 standard drinks of alcohol     Types: 2 Cans of beer, 2 Standard drinks or equivalent per week     Comment: several tiems per week       SYSTEM Check if Review is Normal Check if Physical Exam is Normal If not normal, please explain:   HEENT [x ] [x ]    NECK & BACK [x ] [x ]    HEART [x ] [x ]    LUNGS [x ] [x ]    ABDOMEN [x ] [x ]    UROGENITAL [x ] [x ]    EXTREMITIES [x ] [x ]    OTHER        [ x ] I have discussed the risks and benefits and alternatives with the patient/family.  They understand and agree to proceed with plan of care.  [ x ] I have reviewed the History and Physical done within the last 30 days.  Any changes noted above.    Benjamin Bush MD

## 2024-06-26 ENCOUNTER — TELEPHONE (OUTPATIENT)
Dept: PAIN CLINIC | Facility: CLINIC | Age: 49
End: 2024-06-26

## 2024-06-26 NOTE — TELEPHONE ENCOUNTER
Gloria called placed to patient for post procedure follow up. Patient stated no questions nor concerns. Pt verbalized understanding to call with any questions or concerns.      Procedure: LUZ  Date: 6/25/2024  Follow up Visit Scheduled: 7/15/2024 with

## 2024-07-15 ENCOUNTER — OFFICE VISIT (OUTPATIENT)
Dept: PAIN CLINIC | Facility: CLINIC | Age: 49
End: 2024-07-15
Payer: COMMERCIAL

## 2024-07-15 VITALS
SYSTOLIC BLOOD PRESSURE: 136 MMHG | OXYGEN SATURATION: 97 % | HEART RATE: 76 BPM | BODY MASS INDEX: 32 KG/M2 | DIASTOLIC BLOOD PRESSURE: 82 MMHG | WEIGHT: 230 LBS

## 2024-07-15 DIAGNOSIS — S43.431D LABRAL TEAR OF SHOULDER, RIGHT, SUBSEQUENT ENCOUNTER: ICD-10-CM

## 2024-07-15 DIAGNOSIS — M50.30 DEGENERATION OF CERVICAL INTERVERTEBRAL DISC: Primary | ICD-10-CM

## 2024-07-15 PROCEDURE — 3075F SYST BP GE 130 - 139MM HG: CPT | Performed by: ANESTHESIOLOGY

## 2024-07-15 PROCEDURE — 99214 OFFICE O/P EST MOD 30 MIN: CPT | Performed by: ANESTHESIOLOGY

## 2024-07-15 PROCEDURE — 3079F DIAST BP 80-89 MM HG: CPT | Performed by: ANESTHESIOLOGY

## 2024-07-15 NOTE — PROGRESS NOTES
Name: Keagan Teran   : 10/24/1975   DOS: 7/15/2024     Pain Clinic Follow Up Visit:     Chief Complaint   Patient presents with    Procedure Follow Up     CERVICAL EPIDURAL STEROID INJECTION with local       Keagan Teran is a 48 year old male with a history of labral tear on the right shoulder along with cervical degenerative disc disease leading to severe right-sided foraminal stenosis.  The patient is following up after cervical epidural steroid injection with significant improvement which he rates a greater than 50% of his neck and shoulder pain symptoms.  While the patient did have relief after intra-articular shoulder injection as well, this is very short-lived.  Pt denies any chills, fever, or weakness. There is no bladder or bowel incontinence associated with the pain.    REVIEW OF SYSTEMS:  A ten point review of systems was performed with pertinent positives and negatives in the HPI.    Allergies   Allergen Reactions    Bees        Current Outpatient Medications   Medication Sig Dispense Refill    Meloxicam 15 MG Oral Tab Take 1 tablet (15 mg total) by mouth daily. 14 tablet 1    GLUCOSAMINE SULFATE OR Take 2 capsules by mouth daily.      Multiple Vitamins-Minerals (MULTIVITAMIN OR) Take by mouth daily.        Omega-3 Fatty Acids (FISH OIL) 875 MG Oral Cap Take by mouth daily.        Cholecalciferol (VITAMIN D OR) Take 1,000 Units by mouth daily.        Thiamine HCl (VITAMIN B-1 OR) Take by mouth daily.             EXAM:   /82   Pulse 76   Wt 230 lb (104.3 kg)   SpO2 97%   BMI 32.08 kg/m²   General:  Patient is a(n) 48 year old year old male in no acute distress.  Neurologic:: WNL-Orientation to time, place and person, normal mood & affect, concentration & attention span intact.   Inspection:  Ambulates with well-coordinated, fluid, non-antalgic gait.  Gait is normal.  Neck: Range of motion intact.  Injection site clear  Cranial nerve: Grossly intact  Respiratory: Nonlabored  Back: Gait  intact.       IMAGES:     Intra fluoroscopy consistent with epidural injection  Cervical MRI with severe stenosis on the right-hand side    ASSESSMENT AND PLAN:     1. Degeneration of cervical intervertebral disc    2. Labral tear of shoulder, right, subsequent encounter        The patient is a 48-year-old gentleman with a history of cervical degenerative disc disease and radiculopathy.  Also has a history of labral tear.  The patient did have significant improvement which she rates at greater than 50% following cervical epidural steroid injection.  Discussed additional treatment options including repeat injection versus follow-up for anterior cervical decompression and fusion.  Patient would like to discuss with wife but is strongly leaning towards repeat epidural injection.  Encourage patient continue with shoulder range of motion and physical therapy exercises.    Orders:  Orders Placed This Encounter   Procedures    [facility] PAIN NAVIGATOR         Radiology orders and consultations:None  The patient indicates understanding of these issues and agrees to the plan.  No follow-ups on file.    Benjamin Bush MD, 7/15/2024, 3:00 PM

## 2024-07-15 NOTE — PROGRESS NOTES
Last procedure: CERVICAL EPIDURAL STEROID INJECTION with local   Date: 06/25/24  Percentage of relief obtained: less than 50%  Duration of relief: current    Current Pain Score: 3-4/10

## 2024-07-15 NOTE — PATIENT INSTRUCTIONS
Refill policies:    Allow 2-3 business days for refills; controlled substances may take longer.  Contact your pharmacy at least 5 days prior to running out of medication and have them send an electronic request or submit request through the “request refill” option in your [employer] account.  Refills are not addressed on weekends; covering physicians do not authorize routine medications on weekends.  No narcotics or controlled substances are refilled after noon on Fridays or by on call physicians.  By law, narcotics must be electronically prescribed.  A 30 day supply with no refills is the maximum allowed.  If your prescription is due for a refill, you may be due for a follow up appointment.  To best provide you care, patients receiving routine medications need to be seen at least once a year.  Patients receiving narcotic/controlled substance medications need to be seen at least once every 3 months.  In the event that your preferred pharmacy does not have the requested medication in stock (e.g. Backordered), it is your responsibility to find another pharmacy that has the requested medication available.  We will gladly send a new prescription to that pharmacy at your request.    Scheduling Tests:    If your physician has ordered radiology tests such as MRI or CT scans, please contact Central Scheduling at [phone] right away to schedule the test.  Once scheduled, the Counts include 234 beds at the [facility] Centralized Referral Team will work with your insurance carrier to obtain pre-certification or prior authorization.  Depending on your insurance carrier, approval may take 3-10 days.  It is highly recommended patients assure they have received an authorization before having a test performed.  If test is done without insurance authorization, patient may be responsible for the entire amount billed.      Precertification and Prior Authorizations:  If your physician has recommended that you have a procedure or additional testing performed the Counts include 234 beds at the [facility]  Centralized Referral Team will contact your insurance carrier to obtain pre-certification or prior authorization.    You are strongly encouraged to contact your insurance carrier to verify that your procedure/test has been approved and is a COVERED benefit.  Although the [facility] Centralized Referral Team does its due diligence, the insurance carrier gives the disclaimer that \"Although the procedure is authorized, this does not guarantee payment.\"    Ultimately the patient is responsible for payment.   Thank you for your understanding in this matter.  Paperwork Completion:  If you require FMLA or disability paperwork for your recovery, please make sure to either drop it off or have it faxed to our office at [phone]. Be sure the form has your name and date of birth on it.  The form will be faxed to our Forms Department and they will complete it for you.  There is a 25$ fee for all forms that need to be filled out.  Please be aware there is a 10-14 day turnaround time.  You will need to sign a release of information (ABBEY) form if your paperwork does not come with one.  You may call the Forms Department with any questions at [phone].  Their fax number is [phone].

## 2024-07-16 ENCOUNTER — TELEPHONE (OUTPATIENT)
Dept: PAIN CLINIC | Facility: CLINIC | Age: 49
End: 2024-07-16

## 2024-07-16 DIAGNOSIS — M54.12 CERVICAL RADICULITIS: Primary | ICD-10-CM

## 2024-07-16 NOTE — TELEPHONE ENCOUNTER
Prior Authorization for LUZ    initiated with  [facility] [phone]  CPT codes: 63838   pending reference # [id number]  Clinical notes submitted via fax to [phone]

## 2024-07-19 NOTE — TELEPHONE ENCOUNTER
Prior authorization request completed for: LUZ   Authorization # [id number]  Authorization dates: 7/16/24-8/15/24  CPT codes approved: 08477  Number of visits/dates of service approved: 1  Physician: peace  Location: [facility]     Patient can be scheduled. Routed to Navigator.

## 2024-07-19 NOTE — TELEPHONE ENCOUNTER
Patient advised of insurance approval to proceed with injections and is agreeable to scheduling. Patient scheduled for procedure, pre-procedure instructions reviewed. Patient prefers Local sedation. Reviewed sedation instructions including No Fasting & No  Required. Patient encouraged but not required to hold Meloxicam for 24 hours and Fish Oil for 5 days prior to procedure. Patient verbalized understanding of instructions, no further needs at this time.        [facility] PAIN CLINIC  PRE-PROCEDURE INSTRUCTIONS WITHOUT SEDATION    Procedure: LUZ       Appointment Date: 07/29/2024  Check-In Time: 07:15 AM      Prior to the procedure:  Please update us prior to the procedure if you are experiencing any symptoms of infection such as cough, fever, chills, urinary symptoms, or have recently been prescribed antibiotics, have open wounds, have recently had surgery or dental procedures.    Day of Procedure:  **Drivers will be required for patients who receive prescriptions for Valium.    NO FASTING REQUIRED  Please bring your Insurance Card, Photo ID, List of Current Medications and Referral (if applicable) to your appointment.  Please park in the [facility] and follow the signs to the [facility].  Check in at [facility] ([address] [place] [address]) outpatient registration in the [facility].  Please note-No prescriptions will be written by Pain Clinic in [place] on the day of procedure. If you require a refill of medications, please contact the office 48 hours prior to your procedure.  If you have an implanted Spinal Cord or Peripheral Nerve Stimulator: Please remember to turn device off for procedure.        Medication Hold:    Number of days you need to be off for the following medications:    Aggrenox 10 days   Agrylin (Anagrelide) 10 days  Brilinta (Ticagrelor) 7 days  Imbruvica (Ibrutinib) 3 days   Enbrel (Etanercept) 24 hours   Fragmin (Dalteparin) 24 hours   Pletal (Cilostazol) 7  days  Effient (Prasugrel) 7 days  Pradaxa 10 days  Trental 7 days  Eliquis (Apixaban) 3 days  Xarelto (Rivaroxaban) 3 days  Lovenox (Enoxaparin) 24 hours  Aspirin  Greater than 81mg but less than 325mg   5 days  325mg and greater                  7 days  Coumadin       5 days  Procedure may be cancelled if INR is elevated.   Excedrin (with aspirin) 7 days  Plavix (Clopidogrel)                            7 days    NSAIDs: 24 hours preferred      Ibuprofen (Motrin, Advil, Vicoprofen), Naproxen (Naprosyn, Aleve), Piroxcam (Feldene), Meloxicam (Mobic), Oxaprozin (Daypro), Diclofenac (Voltaren), Indomethacin (Indocin), Etodolac (Lodine), Nabumetone (Relafen), Celebrex (Celecoxib)           HERBAL SUPPLEMENTS  5 days preferred  Fish oil, krill oil, Omega-3, Vascepa, Vitamin E, Turmeric, Garlic                       Insurance Authorization:   Most insurances are now requiring a preauthorization for all procedures.  In the event that your insurance does not authorize your procedure within 48 hours of the scheduled date, your procedure will be cancelled and rescheduled to a later date.  Please contact your insurance carrier to determine what your financial responsibility will be for the procedure(s).      Cancellation/Rescheduling Appointment:   In the event you need to cancel or reschedule your appointment, you must notify the office 24 hours prior.    Post-procedure instructions:        Please schedule a follow up visit within 2 to 4 weeks after your last procedure date   Please call our office with any questions or concerns before or after your procedure at  [phone].  If you are a diabetic, please increase the frequency of your glucose monitoring after the procedure as this may cause a temporary increase in your blood sugar.  Contact your primary care physician if your blood sugar rises as you may require some medication adjustment.  It is normal to have increased pain at injection site for up to 3-5 days after  procedure, you can use heat or ice (20 minutes on 20 minutes off) for comfort.    **To hear a recorded version of these instructions, please call [phone] and follow the prompts.  **Para escuchar las instrucciones en Español, por favor de llamar el mag [phone] opción 4.

## 2024-07-29 ENCOUNTER — APPOINTMENT (OUTPATIENT)
Dept: GENERAL RADIOLOGY | Facility: HOSPITAL | Age: 49
End: 2024-07-29
Attending: ANESTHESIOLOGY
Payer: COMMERCIAL

## 2024-07-29 ENCOUNTER — HOSPITAL ENCOUNTER (OUTPATIENT)
Facility: HOSPITAL | Age: 49
Setting detail: HOSPITAL OUTPATIENT SURGERY
Discharge: HOME | End: 2024-07-29
Attending: ANESTHESIOLOGY | Admitting: ANESTHESIOLOGY
Payer: COMMERCIAL

## 2024-07-29 ENCOUNTER — TELEPHONE (OUTPATIENT)
Dept: PAIN CLINIC | Facility: CLINIC | Age: 49
End: 2024-07-29

## 2024-07-29 VITALS
OXYGEN SATURATION: 98 % | SYSTOLIC BLOOD PRESSURE: 132 MMHG | TEMPERATURE: 97 F | HEIGHT: 71 IN | WEIGHT: 230 LBS | DIASTOLIC BLOOD PRESSURE: 86 MMHG | HEART RATE: 62 BPM | BODY MASS INDEX: 32.2 KG/M2 | RESPIRATION RATE: 18 BRPM

## 2024-07-29 PROCEDURE — 62321 NJX INTERLAMINAR CRV/THRC: CPT | Performed by: ANESTHESIOLOGY

## 2024-07-29 PROCEDURE — 3E0R33Z INTRODUCTION OF ANTI-INFLAMMATORY INTO SPINAL CANAL, PERCUTANEOUS APPROACH: ICD-10-PCS | Performed by: ANESTHESIOLOGY

## 2024-07-29 RX ORDER — ONDANSETRON 2 MG/ML
4 INJECTION INTRAMUSCULAR; INTRAVENOUS ONCE AS NEEDED
Status: DISCONTINUED | OUTPATIENT
Start: 2024-07-29 | End: 2024-07-29

## 2024-07-29 RX ORDER — LIDOCAINE HYDROCHLORIDE 10 MG/ML
INJECTION, SOLUTION EPIDURAL; INFILTRATION; INTRACAUDAL; PERINEURAL
Status: DISCONTINUED | OUTPATIENT
Start: 2024-07-29 | End: 2024-07-29

## 2024-07-29 RX ORDER — DEXAMETHASONE SODIUM PHOSPHATE 10 MG/ML
INJECTION, SOLUTION INTRAMUSCULAR; INTRAVENOUS
Status: DISCONTINUED | OUTPATIENT
Start: 2024-07-29 | End: 2024-07-29

## 2024-07-29 RX ORDER — SODIUM CHLORIDE 9 MG/ML
INJECTION, SOLUTION INTRAMUSCULAR; INTRAVENOUS; SUBCUTANEOUS
Status: DISCONTINUED | OUTPATIENT
Start: 2024-07-29 | End: 2024-07-29

## 2024-07-29 RX ORDER — MELOXICAM 15 MG/1
15 TABLET ORAL DAILY
Qty: 14 TABLET | Refills: 1 | Status: SHIPPED | OUTPATIENT
Start: 2024-07-29

## 2024-07-29 RX ORDER — SODIUM CHLORIDE, SODIUM LACTATE, POTASSIUM CHLORIDE, CALCIUM CHLORIDE 600; 310; 30; 20 MG/100ML; MG/100ML; MG/100ML; MG/100ML
100 INJECTION, SOLUTION INTRAVENOUS CONTINUOUS
Status: DISCONTINUED | OUTPATIENT
Start: 2024-07-29 | End: 2024-07-29

## 2024-07-29 NOTE — OPERATIVE REPORT
[facility]  Operative Report  2024     Keagan Teran Patient Status:  [facility] Outpatient Surgery    10/24/1975 MRN [id number]   Location [facility] PAIN CENTER Attending Benjamin Bush MD   Hosp Day # 0 PCP William Ortiz MD     Indication: Keagan is a 48 year old male with cervical radiculitis    Preoperative Diagnosis:  Cervical radiculitis [M54.12]    Postoperative Diagnosis: Same as above.    Procedure performed: CERVICAL EPIDURAL STEROID INJECTION with local    Anesthesia: Local      EBL: Less than 1 ml.    Procedure Description:  After reviewing the patient's history and performing a focused physical examination, the diagnosis was confirmed and contraindications such as infection and coagulopathy were ruled out.  Following review of allergies, potential side effects, and complications, including but not necessarily limited to infection, allergic reaction, local tissue breakdown, nerve injury, post-dural puncture headache and paresis, the patient indicated they understood and agreed to proceed.  After obtaining the informed consent, the patient was brought to the procedure room and monitored.       The patient was brought to the procedure room and positioned prone.  After comprehensive monitors were applied, the patient's neck was prepped and draped sterilely.  After local anesthetic was instilled in the skin and subcutaneous tissue, a 20-gauge Tuohy needle was introduced and advanced under fluoroscopy at C7-T1.  The epidural space was reached by using a loss of resistance to air technique. There was no C.S.F. or blood through the needle. After obtaining a good epidurogram by injecting Omnipaque 180 1 mL, a combination of normal saline and dexamethasone 10 mg in total volume of 4 mL was injected.  The needle was then flushed with normal saline 1 mL.  The stylet re-applied.  The needle was withdrawn with the tip intact.  The patient tolerated the procedure very well and recovered and  was discharged to a responsible adult after discharge criteria were met.        Complications: None.    Follow up:  The patient was followed in the pain clinic as needed basis.          Benjamin Bush MD

## 2024-07-29 NOTE — H&P
History & Physical Examination    Patient Name: Keagan Teran  MRN: [id number]  CSN: [id number]  YOB: 1975    Pre-Operative Diagnosis:  Cervical radiculitis [M54.12]    Present Illness: Cervical radiculitis    ASA: 1  MP class: 1  Sedation: Local      Medications Prior to Admission   Medication Sig Dispense Refill Last Dose    Meloxicam 15 MG Oral Tab Take 1 tablet (15 mg total) by mouth daily. 14 tablet 1 7/26/2024    GLUCOSAMINE SULFATE OR Take 2 capsules by mouth daily.       Multiple Vitamins-Minerals (MULTIVITAMIN OR) Take by mouth daily.         Omega-3 Fatty Acids (FISH OIL) 875 MG Oral Cap Take by mouth daily.     7/21/2024    Cholecalciferol (VITAMIN D OR) Take 1,000 Units by mouth daily.         Thiamine HCl (VITAMIN B-1 OR) Take by mouth daily.          Current Facility-Administered Medications   Medication Dose Route Frequency    lactated ringers infusion  100 mL/hr Intravenous Continuous    ondansetron (Zofran) 4 MG/2ML injection 4 mg  4 mg Intravenous Once PRN       Allergies:   Allergies   Allergen Reactions    Bees        Past Medical History:    Acute tonsillitis    Anxiety    Arthritis    Back pain    Concussion    Dizziness    Flatulence/gas pain/belching    Foot pain    soft tissue    Headache disorder    Labral tear of shoulder, right, subsequent encounter    Lipid screening    Lower back pain    Otitis media of left ear    Pain in joints    Personal history of tobacco use, presenting hazards to health    Skin rash    Sore throat    Stress    URI (upper respiratory infection)    Viral labyrinthitis     Past Surgical History:   Procedure Laterality Date    Other surgical history  1996    urethra surgery, removal of cyst    Vasectomy Bilateral 10/15/2015    Dr. Loredo     Family History   Problem Relation Age of Onset    Heart Attack Father     Diabetes Father     Colon Polyps Father     Heart Disease Father     Other (Other) Father     Uterine Cancer Mother     Other (CHF) Mother      Prostate Cancer Paternal Grandmother     Colon Polyps Sister     Prostate Cancer Paternal Grandfather     Colon Polyps Sister      Social History     Tobacco Use    Smoking status: Former     Current packs/day: 0.00     Average packs/day: 1 pack/day for 15.0 years (15.0 ttl pk-yrs)     Types: Cigarettes     Start date: 1986     Quit date: 2001     Years since quittin.5    Smokeless tobacco: Never   Substance Use Topics    Alcohol use: Yes     Alcohol/week: 4.0 standard drinks of alcohol     Types: 2 Cans of beer, 2 Standard drinks or equivalent per week     Comment: several tiems per week       SYSTEM Check if Review is Normal Check if Physical Exam is Normal If not normal, please explain:   HEENT [x ] [x ]    NECK & BACK [x ] [x ]    HEART [x ] [x ]    LUNGS [x ] [x ]    ABDOMEN [x ] [x ]    UROGENITAL [x ] [x ]    EXTREMITIES [x ] [x ]    OTHER        [ x ] I have discussed the risks and benefits and alternatives with the patient/family.  They understand and agree to proceed with plan of care.  [ x ] I have reviewed the History and Physical done within the last 30 days.  Any changes noted above.    Benjamin Bush MD

## 2024-07-29 NOTE — DISCHARGE INSTRUCTIONS
Home Care Instructions Following Your Pain Procedure     Keagan,  It has been a pleasure to have you as our patient. To help you at home, you must follow these general discharge instructions. We will review these with you before you are discharged. It is our hope that you have a complete and uneventful recovery from our procedure.     General Instructions:  What to Expect:  Bandages from your procedure today can be removed when you get home.  Please avoid soaking and/or swimming for 24 hours.  Showering is okay  It is normal to have increased pain symptoms and/or pain at injection site for up to 3-5 days after procedure, you can use heat or ice (20 minutes on 20 minutes off) for comfort.  You may experience some temporary side effects which may include restlessness or insomnia, flushing of the face, or heart palpitations.  Please contact the provider if these symptoms do not resolve within 3-4 days.  Lightheadedness or nausea may occur and should resolve within 24 to 48 hours.  If you develop a headache after treatment, rest, drink fluids (with caffeine, if possible) and take mild over-the-counter pain medication.  If the headache does not improve with the above treatment, contact the physician.  Home Medications:  Resume all previously prescribed medication.  Please avoid taking NSAIDs (Non-Steriodal Anti-Inflammatory Drugs) such as:  Ibuprofen ( Advil, Motrin) Aleve (Naproxen), Diclofenac, Meloxicam for 6 hours after procedure.   If you are on Coumadin (Warfarin) or any other anti-coagulant (or \"blood thinning\") medication such as Plavix (Clopidogrel), Xarelto (Rivaroxaban), Eliquis (Apixaban), Effient (Prasugrel) etc., restart on the following day from the procedure unless otherwise directed by your provider.  If you are a diabetic, please increase the frequency of your glucose monitoring after the procedure as steroids may cause a temporary (2-3 day) increase in your blood sugar.  Contact your primary care  physician if your blood sugar remains elevated as you may require some medication adjustment.  Diet:  Resume your regular diet as tolerated.  Activity:  We recommend that you relax and rest during the rest of your procedure day.  If you feel weakness in your arms or legs do not drive.  Follow-up Appointment  Please schedule a follow-up visit within 3 to 4 weeks after your last procedure date.  Question or Concerns:  Feel free to call our office with any questions or concerns at [phone] (option #2)    Keagan  Thank you for coming to [facility] for your procedure.  The nurses try very hard to make sure you receive the best care possible.  Your trust in them as well as us is greatly appreciated.    Thanks so much,   Dr. Benjamin Bush

## 2024-08-29 DIAGNOSIS — S43.431D LABRAL TEAR OF SHOULDER, RIGHT, SUBSEQUENT ENCOUNTER: ICD-10-CM

## 2024-08-29 DIAGNOSIS — M48.02 FORAMINAL STENOSIS OF CERVICAL REGION: ICD-10-CM

## 2024-08-29 DIAGNOSIS — M50.30 DEGENERATION OF CERVICAL INTERVERTEBRAL DISC: Primary | ICD-10-CM

## 2024-08-30 RX ORDER — MELOXICAM 15 MG/1
15 TABLET ORAL DAILY
Qty: 14 TABLET | Refills: 1 | Status: SHIPPED | OUTPATIENT
Start: 2024-08-30

## 2024-08-30 NOTE — TELEPHONE ENCOUNTER
Medication: Meloxicam 15 MG     Date of last refill: 07/29/24      Last office visit: 07/15/24  Due back to clinic per last office note:  na  Date next office visit scheduled:  none        Last OV note recommendation:   ASSESSMENT AND PLAN:      1. Degeneration of cervical intervertebral disc    2. Labral tear of shoulder, right, subsequent encounter          The patient is a 48-year-old gentleman with a history of cervical degenerative disc disease and radiculopathy.  Also has a history of labral tear.  The patient did have significant improvement which she rates at greater than 50% following cervical epidural steroid injection.  Discussed additional treatment options including repeat injection versus follow-up for anterior cervical decompression and fusion.  Patient would like to discuss with wife but is strongly leaning towards repeat epidural injection.  Encourage patient continue with shoulder range of motion and physical therapy exercises.

## 2024-09-28 DIAGNOSIS — S43.431D LABRAL TEAR OF SHOULDER, RIGHT, SUBSEQUENT ENCOUNTER: ICD-10-CM

## 2024-09-28 DIAGNOSIS — M48.02 FORAMINAL STENOSIS OF CERVICAL REGION: ICD-10-CM

## 2024-09-28 DIAGNOSIS — M50.30 DEGENERATION OF CERVICAL INTERVERTEBRAL DISC: ICD-10-CM

## 2024-09-30 RX ORDER — MELOXICAM 15 MG/1
15 TABLET ORAL DAILY
Qty: 14 TABLET | Refills: 1 | Status: SHIPPED | OUTPATIENT
Start: 2024-09-30

## 2024-09-30 NOTE — TELEPHONE ENCOUNTER
Medication: Meloxicam 15 MG     Date of last refill: 08/30/24      Last office visit: 07/15/24  Due back to clinic per last office note:  na  Date next office visit scheduled:  none        Last OV note recommendation:   ASSESSMENT AND PLAN:      1. Degeneration of cervical intervertebral disc    2. Labral tear of shoulder, right, subsequent encounter          The patient is a 48-year-old gentleman with a history of cervical degenerative disc disease and radiculopathy.  Also has a history of labral tear.  The patient did have significant improvement which she rates at greater than 50% following cervical epidural steroid injection.  Discussed additional treatment options including repeat injection versus follow-up for anterior cervical decompression and fusion.  Patient would like to discuss with wife but is strongly leaning towards repeat epidural injection.  Encourage patient continue with shoulder range of motion and physical therapy exercises.

## 2024-10-28 DIAGNOSIS — M48.02 FORAMINAL STENOSIS OF CERVICAL REGION: ICD-10-CM

## 2024-10-28 DIAGNOSIS — M50.30 DEGENERATION OF CERVICAL INTERVERTEBRAL DISC: ICD-10-CM

## 2024-10-28 DIAGNOSIS — S43.431D LABRAL TEAR OF SHOULDER, RIGHT, SUBSEQUENT ENCOUNTER: ICD-10-CM

## 2024-10-29 RX ORDER — MELOXICAM 15 MG/1
15 TABLET ORAL DAILY
Qty: 14 TABLET | Refills: 1 | Status: SHIPPED | OUTPATIENT
Start: 2024-10-29

## 2024-10-29 NOTE — TELEPHONE ENCOUNTER
Medication: MELOXICAM 15 MG     Date of last refill: 09/30/24      Last office visit: 07/15/24  Due back to clinic per last office note:  na  Date next office visit scheduled:  none        Last OV note recommendation:   ASSESSMENT AND PLAN:      1. Degeneration of cervical intervertebral disc    2. Labral tear of shoulder, right, subsequent encounter          The patient is a 48-year-old gentleman with a history of cervical degenerative disc disease and radiculopathy.  Also has a history of labral tear.  The patient did have significant improvement which she rates at greater than 50% following cervical epidural steroid injection.  Discussed additional treatment options including repeat injection versus follow-up for anterior cervical decompression and fusion.  Patient would like to discuss with wife but is strongly leaning towards repeat epidural injection.  Encourage patient continue with shoulder range of motion and physical therapy exercises.     Orders:      Orders Placed This Encounter   Procedures    [facility] PAIN NAVIGATOR            Radiology orders and consultations:None  The patient indicates understanding of these issues and agrees to the plan.  No follow-ups on file.     Benjamin Bush MD, 7/15/2024, 3:00 PM

## 2024-11-25 DIAGNOSIS — S43.431D LABRAL TEAR OF SHOULDER, RIGHT, SUBSEQUENT ENCOUNTER: ICD-10-CM

## 2024-11-25 DIAGNOSIS — M48.02 FORAMINAL STENOSIS OF CERVICAL REGION: ICD-10-CM

## 2024-11-25 DIAGNOSIS — M50.30 DEGENERATION OF CERVICAL INTERVERTEBRAL DISC: ICD-10-CM

## 2024-11-25 RX ORDER — MELOXICAM 15 MG/1
15 TABLET ORAL DAILY
Qty: 14 TABLET | Refills: 1 | Status: SHIPPED | OUTPATIENT
Start: 2024-11-25

## 2024-11-25 NOTE — TELEPHONE ENCOUNTER
Contacted pt at this time and advised that he is due to follow up with Petar Salgado for a 6-month follow-up. Pt vu and states he will give us a call when he has his computer with him to check his schedule and his wife's schedule. Pt is requesting a 30-day supply instead of refilling it every 2 weeks. As this cost him double. Advised pt that this request will be forwarded to the provider.

## 2024-11-25 NOTE — TELEPHONE ENCOUNTER
We should see patient every 6 months for medication evaluation unless he would like his primary to take this over I will refill it this month if we can see him at the beginning of the year

## 2024-11-25 NOTE — TELEPHONE ENCOUNTER
Medication:   Meloxicam 15 MG Oral Tab       Date of last refill: 10/29/24    Last office visit: 7/15/24  Due back to clinic per last office note:  NA  Date next office visit scheduled:  None    Last OV note recommendation:   ASSESSMENT AND PLAN:      1. Degeneration of cervical intervertebral disc    2. Labral tear of shoulder, right, subsequent encounter          The patient is a 48-year-old gentleman with a history of cervical degenerative disc disease and radiculopathy.  Also has a history of labral tear.  The patient did have significant improvement which she rates at greater than 50% following cervical epidural steroid injection.  Discussed additional treatment options including repeat injection versus follow-up for anterior cervical decompression and fusion.  Patient would like to discuss with wife but is strongly leaning towards repeat epidural injection.  Encourage patient continue with shoulder range of motion and physical therapy exercises.     Orders:      Orders Placed This Encounter   Procedures    [facility] PAIN NAVIGATOR            Radiology orders and consultations:None  The patient indicates understanding of these issues and agrees to the plan.  No follow-ups on file.     Benjamin Bush MD, 7/15/2024, 3:00 PM

## 2025-01-31 PROBLEM — R91.1 LUNG NODULE: Status: ACTIVE | Noted: 2018-11-02

## 2025-01-31 PROBLEM — S43.439A GLENOID LABRUM TEAR: Status: ACTIVE | Noted: 2021-08-02

## 2025-01-31 PROBLEM — F41.1 GENERALIZED ANXIETY DISORDER: Status: ACTIVE | Noted: 2021-01-21

## 2025-01-31 PROBLEM — F32.1 MODERATE MAJOR DEPRESSION, SINGLE EPISODE (HCC): Status: ACTIVE | Noted: 2021-01-21

## 2025-02-05 ENCOUNTER — TELEPHONE (OUTPATIENT)
Dept: FAMILY MEDICINE CLINIC | Facility: CLINIC | Age: 50
End: 2025-02-05

## 2025-02-05 NOTE — TELEPHONE ENCOUNTER
Patient was instructed to have EKG completed and he is not sure who to contact? Or is this done in office?    Please advise.

## 2025-02-05 NOTE — TELEPHONE ENCOUNTER
Spoke w/pt and let him know an order has been placed for him to get an EKG at the hospital. Gave pt the number for central scheduling to reach out and schedule. Pt verbalized agreement and understanding.

## 2025-02-10 ENCOUNTER — EKG ENCOUNTER (OUTPATIENT)
Dept: LAB | Age: 50
End: 2025-02-10
Attending: FAMILY MEDICINE
Payer: COMMERCIAL

## 2025-02-10 DIAGNOSIS — Z79.899 HIGH RISK MEDICATION USE: ICD-10-CM

## 2025-02-10 LAB
ATRIAL RATE: 65 BPM
P AXIS: 67 DEGREES
P-R INTERVAL: 188 MS
Q-T INTERVAL: 364 MS
QRS DURATION: 88 MS
QTC CALCULATION (BEZET): 378 MS
R AXIS: 11 DEGREES
T AXIS: 43 DEGREES
VENTRICULAR RATE: 65 BPM

## 2025-02-10 PROCEDURE — 93005 ELECTROCARDIOGRAM TRACING: CPT

## 2025-02-10 PROCEDURE — 93010 ELECTROCARDIOGRAM REPORT: CPT | Performed by: INTERNAL MEDICINE

## 2025-02-17 ENCOUNTER — PATIENT MESSAGE (OUTPATIENT)
Dept: FAMILY MEDICINE CLINIC | Facility: CLINIC | Age: 50
End: 2025-02-17

## 2025-03-14 DIAGNOSIS — F90.0 ATTENTION DEFICIT HYPERACTIVITY DISORDER (ADHD), PREDOMINANTLY INATTENTIVE TYPE: ICD-10-CM

## 2025-03-14 NOTE — TELEPHONE ENCOUNTER
Requested Prescriptions     Pending Prescriptions Disp Refills    methylphenidate ER (CONCERTA) 18 MG Oral Tab CR 30 tablet 0     Sig: Take 1 tablet (18 mg total) by mouth daily.        Last refill: 2/14/25    Last Appointment: LOV Visit date not found     Next Appointment: Visit date not found      It appears that this patient hasn't had an in office visit here since 2022?

## 2025-03-17 NOTE — TELEPHONE ENCOUNTER
Needs follow up regarding the medication to assess effectiveness, etc. Needs to be in the office so he can complete the stim agreement and have a BP check. OK to schedule with another provider in office if needed. Will give short refill until his appt once scheduled.

## 2025-03-18 ENCOUNTER — PATIENT MESSAGE (OUTPATIENT)
Dept: FAMILY MEDICINE CLINIC | Facility: CLINIC | Age: 50
End: 2025-03-18

## 2025-03-18 DIAGNOSIS — F90.0 ATTENTION DEFICIT HYPERACTIVITY DISORDER (ADHD), PREDOMINANTLY INATTENTIVE TYPE: ICD-10-CM

## 2025-03-18 RX ORDER — METHYLPHENIDATE HYDROCHLORIDE 18 MG/1
18 TABLET ORAL DAILY
Qty: 30 TABLET | Refills: 0 | OUTPATIENT
Start: 2025-03-18 | End: 2025-04-17

## 2025-03-18 NOTE — TELEPHONE ENCOUNTER
Requested Prescriptions     Refused Prescriptions Disp Refills    methylphenidate ER (CONCERTA) 18 MG Oral Tab CR 30 tablet 0     Sig: Take 1 tablet (18 mg total) by mouth daily.     Refused By: TU CHAPA     Reason for Refusal: Appt required, please call patient     [employer] message sent to pt to reach out and schedule.

## 2025-03-19 RX ORDER — METHYLPHENIDATE HYDROCHLORIDE 18 MG/1
18 TABLET ORAL DAILY
Qty: 30 TABLET | Refills: 0 | Status: SHIPPED | OUTPATIENT
Start: 2025-03-19 | End: 2025-04-18

## 2025-03-19 NOTE — TELEPHONE ENCOUNTER
Pt called upset that the front office had not let nursing or Dr. Dillon know that an appt was made. His appt is 3/24 and he would like his medication refilled to get him to this appt as he was notified previously,

## 2025-04-08 DIAGNOSIS — M48.02 FORAMINAL STENOSIS OF CERVICAL REGION: ICD-10-CM

## 2025-04-08 DIAGNOSIS — S43.431D LABRAL TEAR OF SHOULDER, RIGHT, SUBSEQUENT ENCOUNTER: ICD-10-CM

## 2025-04-08 DIAGNOSIS — M50.30 DEGENERATION OF CERVICAL INTERVERTEBRAL DISC: ICD-10-CM

## 2025-04-08 RX ORDER — MELOXICAM 15 MG/1
15 TABLET ORAL DAILY
Qty: 14 TABLET | Refills: 1 | Status: SHIPPED | OUTPATIENT
Start: 2025-04-08

## 2025-04-08 NOTE — TELEPHONE ENCOUNTER
Medication: MELOXICAM 15 MG     Date of last refill: 11/25/24      Last office visit: 07/15/24  Due back to clinic per last office note:  NA  Date next office visit scheduled:  none        Last OV note recommendation:   ASSESSMENT AND PLAN:      1. Degeneration of cervical intervertebral disc    2. Labral tear of shoulder, right, subsequent encounter          The patient is a 48-year-old gentleman with a history of cervical degenerative disc disease and radiculopathy.  Also has a history of labral tear.  The patient did have significant improvement which she rates at greater than 50% following cervical epidural steroid injection.  Discussed additional treatment options including repeat injection versus follow-up for anterior cervical decompression and fusion.  Patient would like to discuss with wife but is strongly leaning towards repeat epidural injection.  Encourage patient continue with shoulder range of motion and physical therapy exercises.

## 2025-04-24 DIAGNOSIS — F90.0 ATTENTION DEFICIT HYPERACTIVITY DISORDER (ADHD), PREDOMINANTLY INATTENTIVE TYPE: ICD-10-CM

## 2025-04-30 RX ORDER — METHYLPHENIDATE HYDROCHLORIDE 18 MG/1
18 TABLET ORAL DAILY
Qty: 30 TABLET | Refills: 0 | OUTPATIENT
Start: 2025-04-30 | End: 2025-05-30

## 2025-04-30 NOTE — TELEPHONE ENCOUNTER
Requested Prescriptions     Refused Prescriptions Disp Refills    methylphenidate ER (CONCERTA) 18 MG Oral Tab CR 30 tablet 0     Sig: Take 1 tablet (18 mg total) by mouth daily.     Refused By: TU CHAPA     Reason for Refusal: Patient has requested refill too soon     [employer] message sent to pt to reach out to pharmacy. Pt has future scripts on MAR

## 2025-05-29 DIAGNOSIS — F90.0 ATTENTION DEFICIT HYPERACTIVITY DISORDER (ADHD), PREDOMINANTLY INATTENTIVE TYPE: ICD-10-CM

## 2025-05-29 RX ORDER — METHYLPHENIDATE HYDROCHLORIDE 18 MG/1
18 TABLET ORAL DAILY
Qty: 30 TABLET | Refills: 0 | Status: CANCELLED | OUTPATIENT
Start: 2025-05-29 | End: 2025-06-28

## 2025-06-12 ENCOUNTER — OFFICE VISIT (OUTPATIENT)
Dept: FAMILY MEDICINE CLINIC | Facility: CLINIC | Age: 50
End: 2025-06-12
Payer: COMMERCIAL

## 2025-06-12 VITALS
SYSTOLIC BLOOD PRESSURE: 122 MMHG | TEMPERATURE: 98 F | WEIGHT: 215 LBS | HEART RATE: 74 BPM | OXYGEN SATURATION: 97 % | RESPIRATION RATE: 16 BRPM | BODY MASS INDEX: 29.12 KG/M2 | HEIGHT: 72 IN | DIASTOLIC BLOOD PRESSURE: 82 MMHG

## 2025-06-12 DIAGNOSIS — H65.93 FLUID LEVEL BEHIND TYMPANIC MEMBRANE, BILATERAL: Primary | ICD-10-CM

## 2025-06-12 PROCEDURE — 3074F SYST BP LT 130 MM HG: CPT

## 2025-06-12 PROCEDURE — 99213 OFFICE O/P EST LOW 20 MIN: CPT

## 2025-06-12 PROCEDURE — 3079F DIAST BP 80-89 MM HG: CPT

## 2025-06-12 PROCEDURE — 3008F BODY MASS INDEX DOCD: CPT

## 2025-06-12 NOTE — PROGRESS NOTES
Subjective:   Patient ID: Keagan Teran is a 49 year old male.    Patient presents to clinic with complaints of bilateral muffled hearing and popping, right more than left. Denies other symptoms. Over the counter ear drops tried. History of seasonal allergies but has not been doing his nasal spray as his allergies have been improved.    Ear Problem   There is pain in both ears. This is a new problem. The current episode started in the past 7 days. Pertinent negatives include no coughing, ear discharge, rhinorrhea or sore throat.       History/Other:   Review of Systems   Constitutional:  Negative for fever.   HENT:  Positive for ear pain. Negative for ear discharge, rhinorrhea and sore throat.    Respiratory:  Negative for cough.    All other systems reviewed and are negative.    Current Medications[1]  Allergies:Allergies[2]    Objective:   Physical Exam  Vitals reviewed.   Constitutional:       General: He is not in acute distress.     Appearance: Normal appearance. He is not ill-appearing or toxic-appearing.   HENT:      Head: Normocephalic and atraumatic.      Right Ear: Ear canal and external ear normal. A middle ear effusion is present. Tympanic membrane is not erythematous, retracted or bulging.      Left Ear: Ear canal and external ear normal. A middle ear effusion is present. Tympanic membrane is not erythematous, retracted or bulging.      Ears:      Comments: Right TM with cloudy fluid  Left TM with clear fluid     Nose: Nose normal.      Mouth/Throat:      Mouth: Mucous membranes are moist.      Pharynx: Oropharynx is clear.   Cardiovascular:      Rate and Rhythm: Normal rate and regular rhythm.      Pulses: Normal pulses.      Heart sounds: Normal heart sounds.   Pulmonary:      Effort: Pulmonary effort is normal.      Breath sounds: Normal breath sounds.   Musculoskeletal:         General: Normal range of motion.      Cervical back: Normal range of motion and neck supple.   Lymphadenopathy:       Cervical: No cervical adenopathy.   Skin:     General: Skin is warm and dry.      Capillary Refill: Capillary refill takes less than 2 seconds.   Neurological:      General: No focal deficit present.      Mental Status: He is alert and oriented to person, place, and time.   Psychiatric:         Mood and Affect: Mood normal.         Behavior: Behavior normal.         Assessment & Plan:   1. Fluid level behind tympanic membrane, bilateral        Discussion about supportive treatment including decongestant and nasal spray. Follow up with PCP if symptoms continue or worsen.       Meds This Visit:  Requested Prescriptions      No prescriptions requested or ordered in this encounter       Imaging & Referrals:  None         [1]   Current Outpatient Medications   Medication Sig Dispense Refill    methylphenidate ER (CONCERTA) 18 MG Oral Tab CR Take 1 tablet (18 mg total) by mouth daily. 30 tablet 0    MELOXICAM 15 MG Oral Tab TAKE 1 TABLET(15 MG) BY MOUTH DAILY 14 tablet 1    [START ON 6/19/2025] methylphenidate ER (CONCERTA) 18 MG Oral Tab CR Take 1 tablet (18 mg total) by mouth daily. 30 tablet 0    GLUCOSAMINE SULFATE OR Take 2 capsules by mouth daily.      Multiple Vitamins-Minerals (MULTIVITAMIN OR) Take by mouth daily.        Omega-3 Fatty Acids (FISH OIL) 875 MG Oral Cap Take by mouth daily.        Cholecalciferol (VITAMIN D OR) Take 1,000 Units by mouth daily.        Thiamine HCl (VITAMIN B-1 OR) Take by mouth daily.       [2]   Allergies  Allergen Reactions    Bees

## (undated) DIAGNOSIS — J02.9 SORE THROAT: Primary | ICD-10-CM

## (undated) DIAGNOSIS — Z00.00 LABORATORY EXAMINATION ORDERED AS PART OF A ROUTINE GENERAL MEDICAL EXAMINATION: Primary | ICD-10-CM

## (undated) DEVICE — BANDAID COVERLET 1X3

## (undated) DEVICE — GLOVE,SURG,SENSICARE,ALOE,LF,PF,7: Brand: MEDLINE

## (undated) DEVICE — 3M™ TEGADERM™ TRANSPARENT FILM DRESSING, 1626W, 4 IN X 4-3/4 IN (10 CM X 12 CM), 50 EACH/CARTON, 4 CARTON/CASE: Brand: 3M™ TEGADERM™

## (undated) DEVICE — BANDAGE ADH 1INX3IN NAT FAB N ADH PD CURAD

## (undated) DEVICE — Device

## (undated) DEVICE — CAP,BOUFFANT,SPUNBOND,BLUE,24": Brand: MEDLINE INDUSTRIES, INC.

## (undated) DEVICE — GLOVE SUR 7.5 SENSICARE PIP WHT PWD F

## (undated) DEVICE — GLOVE SURG SENSICARE SZ 7

## (undated) DEVICE — AVANOS* TUOHY EPIDURAL NEEDLE: Brand: AVANOS

## (undated) DEVICE — SYRINGE 10ML SLIP TIP LOSS OF RESIST PLAS

## (undated) DEVICE — GLOVE SURG SENSICARE SZ 7-1/2

## (undated) DEVICE — REMOVER LOT 4OZ N IRRIG UNSCNT SFT MOIST LIQ

## (undated) DEVICE — GLOVE SUR 6.5 SENSICARE PIP WHT PWD F

## (undated) DEVICE — PAIN TRAY: Brand: MEDLINE INDUSTRIES, INC.

## (undated) NOTE — MR AVS SNAPSHOT
[address] [facility] [address]  [facility],  [address]  [address] [place] [address] [phone]               Thank you for choosing us for your health care visit with Tiffany Perez MD.  We are glad to serve you and happy to provide you with this s schedule your appointment. Failure to obtain required authorization numbers can create reimbursement difficulties for you. Ongoing issues with SLAP tear.     Assoc Dx:  Superior glenoid labrum lesion of right shoulder, initial encounter [N39.361Z] Visit [facility] online at  [email or web address]

## (undated) NOTE — MR AVS SNAPSHOT
[address] [facility] [address]  [facility],  [address]  [address] [place] [address] [phone]               Thank you for choosing us for your health care visit with Ismael David PA-C.   We are glad to serve you and happy to provide you with [facility] These medications were sent to [facility] 52 [facility], [facility] Shimon Kenny AT [address], [phone], [address] [facility] [place] [address] [id number]     Phone:  [phone]    - Aluminum Chloride 20 % Soln

## (undated) NOTE — LETTER
Date & Time: 10/28/2020, 9:53 AM  Patient: Lydia Marie  Encounter Provider(s):    MICHAEL Patrick       To Whom It May Concern:    Melvina Blake was seen and treated in our department on 10/28/2020. Patient has sustained an ankle fracture.   Reji Poe

## (undated) NOTE — LETTER
Patient Name: Angeles Chen  YOB: 1975          MRN number:  [id number]  Date:  3/9/2017  Referring Physician:  Srikanth Porras         SPINE EVALUATION:    Referring Physician: Dr. Wanna Bloch: Right shoulder pain, unspecified chronic degenerative disk disease at those 2 levels.    Pt describes pain level 4-8/10. Current functional limitations include ADL's, disturbed sleep at night, lifting, carrying. Keagan describes prior level of function very active, has young children.  Pt goals Palpation: increased mm tone in suboccipitals, R upper trap and cervical paraspinals. Tenderness over R clavicle and pectoralis minor region.      Strength:    strength 50/58/58 kg; 48/48/48 kg    Flexibility:   UE/Scapular   Upper Trap: R moderate; L m Therapeutic Exercises; Neuromuscular Re-education; Therapeutic Activity;  Electrical Stim; Mechanical Traction; Pt education; Home exercise program instruction    Education or treatment limitation: high symptoms irritability, distal symptoms  [facility]

## (undated) NOTE — IP AVS SNAPSHOT
[facility] [place] [address] [place], [address] ~ [phone]                Discharge Summary   6/1/2017    Mr.  [address] [facility]           Admission Information        Provider Department    6/1/2017 Sejal Agee MD [facility] Please see page 1    It  has been a pleasure to have you as our patient. To help you at home, you must follow these general discharge instructions. We will review these with you before you are discharged.  It is our hope that you have a complete and uneve [address] [address] [phone]        Immunization History as of 6/1/2017  Never Reviewed    INFLUENZA 10/16/2012    TDAP 8/16/2010      Radiology Exams     None         Additional Information       We are concerned for your overall well

## (undated) NOTE — MR AVS SNAPSHOT
[address] [facility]  [address] [facility], [address] [place], [place] [id number]               Thank you for choosing us for your health care visit with ANMOL Olivares.   We are glad to serve you and happy to provide you with this PRESCRIPTIONS. ? Written prescriptions must be picked up in office. ? Please allow the office 48-72 hours to fill the prescription. ? Patient must present photo ID at time of .       Scheduling Tests    If your physician has ordered radiology maria isabel ? Please take your morning blood pressure medication with a small sip of water. ? You are required to have a responsible adult drive you home after your procedure. You may not take a cab unless you have a responsible adult with you in the cab. ?  A family Most insurances are now requiring a preauthorization for all procedures. In the event that your insurance does not authorize your procedure within 48 hours of the scheduled date, your procedure will be cancelled and rescheduled to a later date.    Please radiate to other parts of your body like your legs. What is the purpose of an epidural injection?   The long acting anti-inflammatory medication, or steroid, that is injected reduces inflammation and swelling of the nerves and other tissues surrounding the you may have some soreness or aching for several days. You should start to notice pain relief around the third day or so. Can I go back to work the next day? You should be able to go back to work the next day.   Again it is normal to feel some soreness o ? Slight fever   ? Hiccups   ? Insomnia   ? Headache   ? Water retention   ? Increased appetite   ? Increased heart rate   ? Abdominal cramping or bloating   You should contact our office immediately if you experience any side effects.   If they occur, thes [email or web address]. If you've recently had a stay at the Hospital you can access your discharge instructions in [employer] by going to Visits < Admission Summaries.  If you've been to the Emergency Department or your doctor's office, you can view yo

## (undated) NOTE — MR AVS SNAPSHOT
[facility] [place]  [place],  [address]  [address] [place] [address] [phone]               Thank you for choosing us for your health care visit with [phone], DO.   We are glad to serve you and happy to provide you with this sum numbers can create reimbursement difficulties for you.     Assoc Dx:  Chronic right-sided thoracic back pain [M54.6, G89.29]          Reason for Today's Visit     Sore Throat     Referral     Orders Call           Medical Issues Discussed Today     Acute no Summaries. If you've been to the Emergency Department or your doctor's office, you can view your past visit information in [employer] by going to Visits < Visit Summaries. [employer] questions? Call [phone] for help.   [employer] is NOT to be used for urge